# Patient Record
Sex: MALE | Race: WHITE | NOT HISPANIC OR LATINO | Employment: PART TIME | URBAN - METROPOLITAN AREA
[De-identification: names, ages, dates, MRNs, and addresses within clinical notes are randomized per-mention and may not be internally consistent; named-entity substitution may affect disease eponyms.]

---

## 2018-10-24 ENCOUNTER — OFFICE VISIT (OUTPATIENT)
Dept: URGENT CARE | Facility: CLINIC | Age: 18
End: 2018-10-24
Payer: COMMERCIAL

## 2018-10-24 VITALS
TEMPERATURE: 97 F | RESPIRATION RATE: 18 BRPM | WEIGHT: 250 LBS | SYSTOLIC BLOOD PRESSURE: 125 MMHG | BODY MASS INDEX: 33.13 KG/M2 | DIASTOLIC BLOOD PRESSURE: 75 MMHG | HEIGHT: 73 IN | HEART RATE: 66 BPM | OXYGEN SATURATION: 98 %

## 2018-10-24 DIAGNOSIS — J02.9 PHARYNGITIS, UNSPECIFIED ETIOLOGY: Primary | ICD-10-CM

## 2018-10-24 LAB — S PYO AG THROAT QL: NEGATIVE

## 2018-10-24 PROCEDURE — 87430 STREP A AG IA: CPT | Performed by: PHYSICIAN ASSISTANT

## 2018-10-24 PROCEDURE — 99203 OFFICE O/P NEW LOW 30 MIN: CPT | Performed by: PHYSICIAN ASSISTANT

## 2018-10-24 PROCEDURE — S9088 SERVICES PROVIDED IN URGENT: HCPCS | Performed by: PHYSICIAN ASSISTANT

## 2018-10-24 RX ORDER — PREDNISONE 50 MG/1
50 TABLET ORAL DAILY
Qty: 5 TABLET | Refills: 0 | Status: SHIPPED | OUTPATIENT
Start: 2018-10-24 | End: 2018-10-29

## 2018-10-24 RX ORDER — IBUPROFEN 600 MG/1
TABLET ORAL EVERY 6 HOURS PRN
COMMUNITY

## 2018-10-24 NOTE — LETTER
October 24, 2018     Patient: Franco Esteves   YOB: 2000   Date of Visit: 10/24/2018       To Whom It May Concern: It is my medical opinion that Franco Esteves may return to work on 10/26/2018  If you have any questions or concerns, please don't hesitate to call           Sincerely,        Harris Trevino PA-C    CC: No Recipients

## 2018-10-24 NOTE — PROGRESS NOTES
[facility]        NAME: Gilma Franco is a 19 y o  male  : 2000    MRN: [id number]  DATE: 2018  TIME: 4:44 PM    Assessment and Plan   Pharyngitis, unspecified etiology [J02 9]  1  Pharyngitis, unspecified etiology  POCT rapid strepA    predniSONE 50 mg tablet    Throat culture     Patient Instructions     Take medicine as prescribed  Will call if there is a discrepancy with your throat culture  Follow up with PCP in 3-5 days  Proceed to  ER if symptoms worsen  Chief Complaint     Chief Complaint   Patient presents with    Sore Throat     sore throat for 2 days     History of Present Illness       Sore Throat    This is a new problem  The current episode started yesterday  The problem has been gradually worsening  Neither side of throat is experiencing more pain than the other  There has been no fever  The pain is moderate  Associated symptoms include swollen glands and trouble swallowing  Pertinent negatives include no abdominal pain, congestion, coughing, diarrhea, drooling, ear discharge, ear pain, headaches, hoarse voice, plugged ear sensation, neck pain, shortness of breath, stridor or vomiting  Associated symptoms comments: Tactile fever  He has tried nothing for the symptoms  Review of Systems   Review of Systems   Constitutional: Positive for fatigue and fever  HENT: Positive for sore throat and trouble swallowing  Negative for congestion, drooling, ear discharge, ear pain and hoarse voice  Respiratory: Negative for apnea, cough, choking, chest tightness, shortness of breath, wheezing and stridor  Gastrointestinal: Negative for abdominal pain, diarrhea and vomiting  Musculoskeletal: Negative for neck pain  Neurological: Negative for headaches           Current Medications       Current Outpatient Prescriptions:     ibuprofen (MOTRIN) 600 mg tablet, Take by mouth every 6 (six) hours as needed for mild pain, Disp: , Rfl:     sertraline (ZOLOFT) 50 mg tablet, Take 50 mg by mouth daily, Disp: , Rfl:     predniSONE 50 mg tablet, Take 1 tablet (50 mg total) by mouth daily for 5 days, Disp: 5 tablet, Rfl: 0    Current Allergies     Allergies as of 10/24/2018    (No Known Allergies)            The following portions of the patient's history were reviewed and updated as appropriate: allergies, current medications, past family history, past medical history, past social history, past surgical history and problem list      Past Medical History:   Diagnosis Date    Anxiety     Depression        Past Surgical History:   Procedure Laterality Date    ADENOIDECTOMY      TONSILLECTOMY         Family History   Problem Relation Age of Onset    No Known Problems Mother     No Known Problems Father          Medications have been verified  Objective   /75   Pulse 66   Temp (!) 97 °F (36 1 °C) (Tympanic)   Resp 18   Ht 6' 1" (1 854 m)   Wt 113 kg (250 lb)   SpO2 98%   BMI 32 98 kg/m²        Physical Exam     Physical Exam   Constitutional: He appears well-developed and well-nourished  HENT:   Head: Normocephalic  Right Ear: External ear normal    Left Ear: External ear normal    Nose: Mucosal edema and rhinorrhea present  Mouth/Throat: Posterior oropharyngeal edema and posterior oropharyngeal erythema present  No oropharyngeal exudate  Cardiovascular: Normal rate, normal heart sounds and intact distal pulses  Exam reveals no gallop and no friction rub  No murmur heard  Pulmonary/Chest: Effort normal and breath sounds normal  No respiratory distress  He has no wheezes  He has no rales  Abdominal: Soft  Bowel sounds are normal  He exhibits no distension  There is no tenderness  There is no rebound and no guarding  Lymphadenopathy:     He has cervical adenopathy  Right cervical: Superficial cervical adenopathy present  Left cervical: Superficial cervical adenopathy present

## 2022-09-30 ENCOUNTER — HOSPITAL ENCOUNTER (EMERGENCY)
Facility: HOSPITAL | Age: 22
Discharge: HOME | End: 2022-09-30
Attending: EMERGENCY MEDICINE
Payer: COMMERCIAL

## 2022-09-30 VITALS
TEMPERATURE: 98.7 F | OXYGEN SATURATION: 99 % | WEIGHT: 180 LBS | SYSTOLIC BLOOD PRESSURE: 137 MMHG | HEART RATE: 87 BPM | DIASTOLIC BLOOD PRESSURE: 63 MMHG | RESPIRATION RATE: 18 BRPM

## 2022-09-30 DIAGNOSIS — F19.90 ILLICIT DRUG USE: Primary | ICD-10-CM

## 2022-09-30 DIAGNOSIS — T50.901A DRUG OVERDOSE: ICD-10-CM

## 2022-09-30 DIAGNOSIS — F41.9 ANXIETY: ICD-10-CM

## 2022-09-30 LAB
2HR DELTA HS TROPONIN: 6 NG/L
ALBUMIN SERPL BCP-MCNC: 4.6 G/DL (ref 3.5–5)
ALP SERPL-CCNC: 74 U/L (ref 46–116)
ALT SERPL W P-5'-P-CCNC: 39 U/L (ref 12–78)
AMPHETAMINES SERPL QL SCN: NEGATIVE
ANION GAP SERPL CALCULATED.3IONS-SCNC: 9 MMOL/L (ref 4–13)
APAP SERPL-MCNC: <2 UG/ML (ref 10–20)
AST SERPL W P-5'-P-CCNC: 36 U/L (ref 5–45)
BARBITURATES UR QL: NEGATIVE
BASOPHILS # BLD AUTO: 0.07 THOUSANDS/ΜL (ref 0–0.1)
BASOPHILS NFR BLD AUTO: 1 % (ref 0–1)
BENZODIAZ UR QL: NEGATIVE
BILIRUB SERPL-MCNC: 0.62 MG/DL (ref 0.2–1)
BILIRUB UR QL STRIP: NEGATIVE
BUN SERPL-MCNC: 20 MG/DL (ref 5–25)
CALCIUM SERPL-MCNC: 9.5 MG/DL (ref 8.3–10.1)
CARDIAC TROPONIN I PNL SERPL HS: 10 NG/L
CARDIAC TROPONIN I PNL SERPL HS: 4 NG/L
CHLORIDE SERPL-SCNC: 99 MMOL/L (ref 96–108)
CLARITY UR: CLEAR
CO2 SERPL-SCNC: 29 MMOL/L (ref 21–32)
COCAINE UR QL: NEGATIVE
COLOR UR: YELLOW
CREAT SERPL-MCNC: 1.18 MG/DL (ref 0.6–1.3)
EOSINOPHIL # BLD AUTO: 0.06 THOUSAND/ΜL (ref 0–0.61)
EOSINOPHIL NFR BLD AUTO: 1 % (ref 0–6)
ERYTHROCYTE [DISTWIDTH] IN BLOOD BY AUTOMATED COUNT: 11.9 % (ref 11.6–15.1)
ETHANOL SERPL-MCNC: <3 MG/DL (ref 0–3)
GFR SERPL CREATININE-BSD FRML MDRD: 85 ML/MIN/1.73SQ M
GLUCOSE SERPL-MCNC: 171 MG/DL (ref 65–140)
GLUCOSE UR STRIP-MCNC: NEGATIVE MG/DL
HCT VFR BLD AUTO: 46.5 % (ref 36.5–49.3)
HGB BLD-MCNC: 16.1 G/DL (ref 12–17)
HGB UR QL STRIP.AUTO: NEGATIVE
IMM GRANULOCYTES # BLD AUTO: 0.08 THOUSAND/UL (ref 0–0.2)
IMM GRANULOCYTES NFR BLD AUTO: 1 % (ref 0–2)
KETONES UR STRIP-MCNC: NEGATIVE MG/DL
LEUKOCYTE ESTERASE UR QL STRIP: NEGATIVE
LYMPHOCYTES # BLD AUTO: 1.09 THOUSANDS/ΜL (ref 0.6–4.47)
LYMPHOCYTES NFR BLD AUTO: 8 % (ref 14–44)
MCH RBC QN AUTO: 30.3 PG (ref 26.8–34.3)
MCHC RBC AUTO-ENTMCNC: 34.6 G/DL (ref 31.4–37.4)
MCV RBC AUTO: 88 FL (ref 82–98)
METHADONE UR QL: NEGATIVE
MONOCYTES # BLD AUTO: 0.66 THOUSAND/ΜL (ref 0.17–1.22)
MONOCYTES NFR BLD AUTO: 5 % (ref 4–12)
NEUTROPHILS # BLD AUTO: 11.11 THOUSANDS/ΜL (ref 1.85–7.62)
NEUTS SEG NFR BLD AUTO: 84 % (ref 43–75)
NITRITE UR QL STRIP: NEGATIVE
NRBC BLD AUTO-RTO: 0 /100 WBCS
OPIATES UR QL SCN: NEGATIVE
OXYCODONE+OXYMORPHONE UR QL SCN: NEGATIVE
PCP UR QL: NEGATIVE
PH UR STRIP.AUTO: 6 [PH]
PLATELET # BLD AUTO: 247 THOUSANDS/UL (ref 149–390)
PMV BLD AUTO: 10.3 FL (ref 8.9–12.7)
POTASSIUM SERPL-SCNC: 4.3 MMOL/L (ref 3.5–5.3)
PROT SERPL-MCNC: 8.3 G/DL (ref 6.4–8.4)
PROT UR STRIP-MCNC: NEGATIVE MG/DL
RBC # BLD AUTO: 5.31 MILLION/UL (ref 3.88–5.62)
SALICYLATES SERPL-MCNC: <3 MG/DL (ref 3–20)
SODIUM SERPL-SCNC: 137 MMOL/L (ref 135–147)
SP GR UR STRIP.AUTO: <=1.005 (ref 1–1.03)
THC UR QL: NEGATIVE
UROBILINOGEN UR QL STRIP.AUTO: 0.2 E.U./DL
WBC # BLD AUTO: 13.07 THOUSAND/UL (ref 4.31–10.16)

## 2022-09-30 PROCEDURE — 80307 DRUG TEST PRSMV CHEM ANLYZR: CPT | Performed by: PHYSICIAN ASSISTANT

## 2022-09-30 PROCEDURE — 82077 ASSAY SPEC XCP UR&BREATH IA: CPT | Performed by: PHYSICIAN ASSISTANT

## 2022-09-30 PROCEDURE — 84484 ASSAY OF TROPONIN QUANT: CPT | Performed by: PHYSICIAN ASSISTANT

## 2022-09-30 PROCEDURE — 80179 DRUG ASSAY SALICYLATE: CPT | Performed by: PHYSICIAN ASSISTANT

## 2022-09-30 PROCEDURE — 96361 HYDRATE IV INFUSION ADD-ON: CPT

## 2022-09-30 PROCEDURE — 99285 EMERGENCY DEPT VISIT HI MDM: CPT | Performed by: PHYSICIAN ASSISTANT

## 2022-09-30 PROCEDURE — 36415 COLL VENOUS BLD VENIPUNCTURE: CPT | Performed by: PHYSICIAN ASSISTANT

## 2022-09-30 PROCEDURE — 96360 HYDRATION IV INFUSION INIT: CPT

## 2022-09-30 PROCEDURE — 81003 URINALYSIS AUTO W/O SCOPE: CPT | Performed by: PHYSICIAN ASSISTANT

## 2022-09-30 PROCEDURE — 85025 COMPLETE CBC W/AUTO DIFF WBC: CPT | Performed by: PHYSICIAN ASSISTANT

## 2022-09-30 PROCEDURE — 99284 EMERGENCY DEPT VISIT MOD MDM: CPT

## 2022-09-30 PROCEDURE — 93005 ELECTROCARDIOGRAM TRACING: CPT

## 2022-09-30 PROCEDURE — 80053 COMPREHEN METABOLIC PANEL: CPT | Performed by: PHYSICIAN ASSISTANT

## 2022-09-30 PROCEDURE — 80143 DRUG ASSAY ACETAMINOPHEN: CPT | Performed by: PHYSICIAN ASSISTANT

## 2022-09-30 RX ORDER — ONDANSETRON 2 MG/ML
4 INJECTION INTRAMUSCULAR; INTRAVENOUS ONCE
Status: DISCONTINUED | OUTPATIENT
Start: 2022-09-30 | End: 2022-09-30 | Stop reason: HOSPADM

## 2022-09-30 RX ADMIN — SODIUM CHLORIDE 1000 ML: 0.9 INJECTION, SOLUTION INTRAVENOUS at 14:28

## 2022-09-30 RX ADMIN — SODIUM CHLORIDE 1000 ML: 0.9 INJECTION, SOLUTION INTRAVENOUS at 16:55

## 2022-09-30 NOTE — DISCHARGE INSTRUCTIONS
Please avoid any illicit drug use  Please return immediately with any thoughts of hurting yourself or anyone else  Please is return immediately with any thoughts of illegal drug use  Return with any new or worsening symptoms  I have placed a psychiatry referral for you in efforts to aid your outpatient therapy search

## 2022-09-30 NOTE — CONSULTS
INTERPROFESSIONAL (PHONE) [facility]  Blossom Silver 23 y o  male MRN: [id number]  Unit/Bed#: ED 06 Encounter: [id number]       Reason for Consult / Principal Problem: Drug ingestion    Inpatient consult to Toxicology  Consult performed by: Halima Wesley MD  Consult ordered by: Db Chan PA-C        09/30/22    ASSESSMENT:  Reported LSD ingestion  Sympathomimetic toxicity  Marijuana use  Tachycardia    RECOMMENDATIONS:  Obtain EKG, CBC, CMP, ETOH/salicylate/acetaminophen levels  Maintain cardiac telemetry while tachycardic and monitor airway  Treatment is supportive with IVF hydration and benzodiazepines as needed for agitation  Patient should be monitored for 6 hours post ingestion to ensure no worsening of toxicity  If patient's vital signs and mentation return to normal and he is asymptomatic and ambulatory with a steady gait in addition to having normal labs, he is appropriate for disposition from a toxicology perspective  For further questions, please contact the medical  on call via [place] [facility] or throughl the [employer]  Service or Patient [facility]  Please see additional teaching note below:    [facility]   [address]  Sympathomimetic Toxicity     Sympathomimetic toxicity is diagnosed clinically by tachycardia, hypertension, mydriasis, diaphoresis, agitation, and hyperthermia secondary to exposure to sympathomimetic agent, such as cocaine or amphetamine/methamphetamines  This causes overstimulation of central nervous system resulting in increased catecholamine release  Primary treatment includes benzodiazepines to suppress central nervous system stimulation  If not treated aggressively enough, hyperthermia and agitation may result in disseminated intravascular coagulation and renal injury, respectively   If hyperthermia occurs and is refractory to aggressive treatment with benzodiazepines, intravenous fluids and external cooling, then intubation and paralysis are necessary  If renal injury is present, patient should be assessed for rhabdomyolysis  In addition, cardiac concerns include ischemia and sodium channel blockade  Untreated tachycardia can result in demand ischemia, especially if there is any underlying coronary artery disease  Na channel blockade is demonstrated by QRS widening on ECG and is treated with sodium bicarbonate  Treatment of these issues should be in conjunction with a medical       Hx and PE limited by the dynamics of a phone consultation  I have not personally interviewed or evaluated the patient, but only advised based on the information provided to me  Primary provider is responsible for all clinical decisions  Pertinent history, physical exam and clinical findings and course discussed: Bertha Barfield is a 23y o  year old male who presents with palpitations after reported ingestion of 4 LSD tablets  Patient also reports taking a benzodiazepine and smoking marijuana prior to arrival  Ingestion was 1 hour prior presentation  Patient tachycardic and mildly hypertensive  Labs pending  EKG with tachycardia and normal intervals  Review of systems and physical exam not performed by me  Historical Information   Past Medical History:   Diagnosis Date    Anxiety      History reviewed  No pertinent surgical history  Social History   Social History     Substance and Sexual Activity   Alcohol Use Yes     Social History     Substance and Sexual Activity   Drug Use Yes    Types: LSD, Other, Marijuana, Benzodiazepines    Comment: THENZODIAZAPINE     Social History     Tobacco Use   Smoking Status Never Smoker   Smokeless Tobacco Never Used     History reviewed  No pertinent family history       Prior to Admission medications    Not on File       Current Facility-Administered Medications   Medication Dose Route Frequency    ondansetron (ZOFRAN) injection 4 mg  4 mg Intravenous Once    sodium chloride 0 9 % bolus 1,000 mL  1,000 mL Intravenous Once       Allergies   Allergen Reactions    Amphetamine-Dextroamphetamine Other (See Comments)    Cat Hair Extract Other (See Comments)    Hydroxyzine Other (See Comments)    Pollen Extract Other (See Comments)       Objective     No intake or output data in the 24 hours ending 09/30/22 1458    Invasive Devices:   Peripheral IV 09/30/22 Right Antecubital (Active)   Site Assessment WDL 09/30/22 1427   Dressing Type Transparent 09/30/22 1427   Line Status Blood return noted; Flushed; Infusing 09/30/22 1427   Dressing Status Clean;Dry; Intact 09/30/22 1427       Vitals   Vitals:    09/30/22 1407 09/30/22 1415 09/30/22 1430 09/30/22 1445   BP: 142/88 137/81 139/72 118/57   Pulse: (!) 133 (!) 129 (!) 123 (!) 120   Resp: 18 19 22 16   Temp: 98 7 °F (37 1 °C)            EKG, Pathology, and/or Other Studies: I have personally reviewed pertinent reports  Lab Results: I have personally reviewed pertinent reports  Labs:    Results from last 7 days   Lab Units 09/30/22  1428   WBC Thousand/uL 13 07*   HEMOGLOBIN g/dL 16 1   HEMATOCRIT % 46 5   PLATELETS Thousands/uL 247   NEUTROS PCT % 84*   LYMPHS PCT % 8*   MONOS PCT % 5          Invalid input(s): LABALBU           No results found for: TROPONINI          Invalid input(s): EXTPREGUR      Imaging Studies: n/a    Counseling / Coordination of Care  Total time spent today 13 minutes  This was a phone consultation

## 2022-09-30 NOTE — ED PROVIDER NOTES
History  Chief Complaint   Patient presents with    Medical Problem     Pt arrives reporting he took 4 tabs of LSD because it makes his mood feel better  Pt reports he has taken it in the past for his anxiety  Pt reports heart racing at time and denies any SI/HI  23year old male presents to the ED with mother for evaluation  Patient states he took 4 LSD pills to make his mood better  States he has take LSD to improve his mood before but never this much  Patient states he then took a benzo and smoked some weed to counter act the LSD  Was brought in by mom because he felt like his heart was racing and was nauseous at home  He has no complaints now  Patient denies any suicidal or homicidal ideations  Patient denies any hallucinations  Reports history of anxiety and depression and states he has been trying to get therapy set up as an outpatient however states due to insurance reason he has been unable to so far  Patient states he took LSD "to make the world one again" for "unity" "I just want harmony for everyone, we can be one "       History provided by:  Patient and parent  Overdose - Accidental  Ingested substance:  Illicit drugs  Illicit drug type:  Marijuana and other  Time since incident:  1 hour  Ingestion amount:  4 pills of LSD, 1 benzo, and smoked "a little" marijuana  Witnesses present: no    Called poison control: spoke with Toxicology  Incident location:  Home  Associated symptoms: altered mental status and nausea    Associated symptoms: no abdominal pain, no agitation, no anxiety, no chest pain, no cough, no diaphoresis, no diarrhea, no headaches, no lethargy, no shortness of breath, no slurred speech, no unresponsiveness, no visual changes and no vomiting        None       Past Medical History:   Diagnosis Date    Anxiety        History reviewed  No pertinent surgical history  History reviewed  No pertinent family history    I have reviewed and agree with the history as documented  E-Cigarette/Vaping     E-Cigarette/Vaping Substances     Social History     Tobacco Use    Smoking status: Never Smoker    Smokeless tobacco: Never Used   Substance Use Topics    Alcohol use: Yes    Drug use: Yes     Types: LSD, Other, Marijuana, Benzodiazepines     Comment: THENZODIAZAPINE       Review of Systems   Constitutional: Negative for appetite change, chills, diaphoresis, fatigue and fever  HENT: Negative  Respiratory: Negative for cough, choking, chest tightness and shortness of breath  Cardiovascular: Positive for palpitations  Negative for chest pain and leg swelling  Gastrointestinal: Positive for nausea  Negative for abdominal pain, diarrhea and vomiting  Musculoskeletal: Negative  Skin: Negative  Neurological: Negative  Negative for headaches  Psychiatric/Behavioral: Negative for agitation  All other systems reviewed and are negative  Physical Exam  Physical Exam  Vitals and nursing note reviewed  Constitutional:       General: He is not in acute distress  Appearance: Normal appearance  He is not ill-appearing, toxic-appearing or diaphoretic  HENT:      Head: Normocephalic and atraumatic  Nose: Nose normal       Mouth/Throat:      Pharynx: Oropharynx is clear  Eyes:      Extraocular Movements: Extraocular movements intact  Conjunctiva/sclera: Conjunctivae normal       Pupils: Pupils are equal, round, and reactive to light  Cardiovascular:      Rate and Rhythm: Regular rhythm  Tachycardia present  Pulmonary:      Effort: Pulmonary effort is normal  No respiratory distress  Breath sounds: Normal breath sounds  No stridor  No wheezing, rhonchi or rales  Chest:      Chest wall: No tenderness  Musculoskeletal:         General: No swelling or tenderness  Normal range of motion  Cervical back: Normal range of motion  Skin:     General: Skin is warm and dry        Capillary Refill: Capillary refill takes less than 2 seconds  Findings: No rash  Neurological:      General: No focal deficit present  Mental Status: He is alert  GCS: GCS eye subscore is 4  GCS verbal subscore is 4  GCS motor subscore is 6  Cranial Nerves: Cranial nerves are intact  Sensory: Sensation is intact  Motor: Motor function is intact  Psychiatric:         Mood and Affect: Mood normal          Behavior: Behavior normal          Thought Content: Thought content is not paranoid or delusional  Thought content does not include homicidal or suicidal ideation  Thought content does not include homicidal or suicidal plan           Vital Signs  ED Triage Vitals   Temperature Pulse Respirations Blood Pressure SpO2   09/30/22 1407 09/30/22 1407 09/30/22 1407 09/30/22 1407 09/30/22 1407   98 7 °F (37 1 °C) (!) 133 18 142/88 98 %      Temp src Heart Rate Source Patient Position - Orthostatic VS BP Location FiO2 (%)   -- 09/30/22 1415 09/30/22 1916 09/30/22 1916 --    Monitor Lying Right arm       Pain Score       09/30/22 1407       No Pain           Vitals:    09/30/22 1730 09/30/22 1745 09/30/22 1915 09/30/22 1916   BP: 126/56 127/92 137/63 137/63   Pulse: 101 97 85 87   Patient Position - Orthostatic VS:    Lying         Visual Acuity      ED Medications  Medications   ondansetron (ZOFRAN) injection 4 mg (4 mg Intravenous Not Given 9/30/22 1656)   sodium chloride 0 9 % bolus 1,000 mL (0 mL Intravenous Stopped 9/30/22 1528)   sodium chloride 0 9 % bolus 1,000 mL (0 mL Intravenous Stopped 9/30/22 1755)       Diagnostic Studies  Results Reviewed     Procedure Component Value Units Date/Time    HS Troponin I 2hr [[id number]]  (Normal) Collected: 09/30/22 1655    Lab Status: Final result Specimen: Blood from Arm, Right Updated: 09/30/22 1729     hs TnI 2hr 10 ng/L      Delta 2hr hsTnI 6 ng/L     Rapid drug screen, urine [[id number]]  (Normal) Collected: 09/30/22 1620    Lab Status: Final result Specimen: Urine, Clean Catch Updated: 09/30/22 1717     Amph/Meth UR Negative     Barbiturate Ur Negative     Benzodiazepine Urine Negative     Cocaine Urine Negative     Methadone Urine Negative     Opiate Urine Negative     PCP Ur Negative     THC Urine Negative     Oxycodone Urine Negative    Narrative:      FOR MEDICAL PURPOSES ONLY  IF CONFIRMATION NEEDED PLEASE CONTACT THE LAB WITHIN 5 DAYS      Drug Screen Cutoff Levels:  AMPHETAMINE/METHAMPHETAMINES  1000 ng/mL  BARBITURATES     200 ng/mL  BENZODIAZEPINES     200 ng/mL  COCAINE      300 ng/mL  METHADONE      300 ng/mL  OPIATES      300 ng/mL  PHENCYCLIDINE     25 ng/mL  THC       50 ng/mL  OXYCODONE      100 ng/mL    UA (URINE) with reflex to Scope [[id number]] Collected: 09/30/22 1620    Lab Status: Final result Specimen: Urine, Clean Catch Updated: 09/30/22 1653     Color, UA Yellow     Clarity, UA Clear     Specific Gravity, UA <=1 005     pH, UA 6 0     Leukocytes, UA Negative     Nitrite, UA Negative     Protein, UA Negative mg/dl      Glucose, UA Negative mg/dl      Ketones, UA Negative mg/dl      Urobilinogen, UA 0 2 E U /dl      Bilirubin, UA Negative     Occult Blood, UA Negative    HS Troponin I 4hr [[id number]]     Lab Status: No result Specimen: Blood     Acetaminophen level-"If concentration is detectable, please discuss with medical  on call " [[id number]]  (Abnormal) Collected: 09/30/22 1428    Lab Status: Final result Specimen: Blood from Arm, Right Updated: 09/30/22 1528     Acetaminophen Level <2 0 ug/mL     Comprehensive metabolic panel [[id number]]  (Abnormal) Collected: 09/30/22 1428    Lab Status: Final result Specimen: Blood from Arm, Right Updated: 09/30/22 1501     Sodium 137 mmol/L      Potassium 4 3 mmol/L      Chloride 99 mmol/L      CO2 29 mmol/L      ANION GAP 9 mmol/L      BUN 20 mg/dL      Creatinine 1 18 mg/dL      Glucose 171 mg/dL      Calcium 9 5 mg/dL      AST 36 U/L      ALT 39 U/L      Alkaline Phosphatase 74 U/L      Total Protein 8 3 g/dL      Albumin 4 6 g/dL      Total Bilirubin 0 62 mg/dL      eGFR 85 ml/min/1 73sq m     Narrative:      National Kidney Disease Foundation guidelines for Chronic Kidney Disease (CKD):     Stage 1 with normal or high GFR (GFR > 90 mL/min/1 73 square meters)    Stage 2 Mild CKD (GFR = 60-89 mL/min/1 73 square meters)    Stage 3A Moderate CKD (GFR = 45-59 mL/min/1 73 square meters)    Stage 3B Moderate CKD (GFR = 30-44 mL/min/1 73 square meters)    Stage 4 Severe CKD (GFR = 15-29 mL/min/1 73 square meters)    Stage 5 End Stage CKD (GFR <15 mL/min/1 73 square meters)  Note: GFR calculation is accurate only with a steady state creatinine    Salicylate level [[id number]]  (Abnormal) Collected: 09/30/22 1428    Lab Status: Final result Specimen: Blood from Arm, Right Updated: 81/62/74 7194     Salicylate Lvl <3 mg/dL     Ethanol [[id number]]  (Normal) Collected: 09/30/22 1428    Lab Status: Final result Specimen: Blood from Arm, Right Updated: 09/30/22 1501     Ethanol Lvl <3 mg/dL     HS Troponin 0hr (reflex protocol) [[id number]]  (Normal) Collected: 09/30/22 1428    Lab Status: Final result Specimen: Blood from Arm, Right Updated: 09/30/22 1501     hs TnI 0hr 4 ng/L     CBC and differential [[id number]]  (Abnormal) Collected: 09/30/22 1428    Lab Status: Final result Specimen: Blood from Arm, Right Updated: 09/30/22 1437     WBC 13 07 Thousand/uL      RBC 5 31 Million/uL      Hemoglobin 16 1 g/dL      Hematocrit 46 5 %      MCV 88 fL      MCH 30 3 pg      MCHC 34 6 g/dL      RDW 11 9 %      MPV 10 3 fL      Platelets 005 Thousands/uL      nRBC 0 /100 WBCs      Neutrophils Relative 84 %      Immat GRANS % 1 %      Lymphocytes Relative 8 %      Monocytes Relative 5 %      Eosinophils Relative 1 %      Basophils Relative 1 %      Neutrophils Absolute 11 11 Thousands/µL      Immature Grans Absolute 0 08 Thousand/uL      Lymphocytes Absolute 1 09 Thousands/µL      Monocytes Absolute 0 66 Thousand/µL      Eosinophils Absolute 0 06 Thousand/µL      Basophils Absolute 0 07 Thousands/µL                  No orders to display              Procedures  ECG 12 Lead Documentation Only    Date/Time: 9/30/2022 4:39 PM  Performed by: Morro Miranda PA-C  Authorized by: Morro Miranda PA-C     Patient location:  ED  Interpretation:     Interpretation: normal    Rate:     ECG rate:  133    ECG rate assessment: tachycardic    Rhythm:     Rhythm: sinus tachycardia    Ectopy:     Ectopy: none    QRS:     QRS axis:  Normal    QRS intervals:  Normal  Conduction:     Conduction: normal    ST segments:     ST segments:  Normal  T waves:     T waves: normal               ED Course  ED Course as of 09/30/22 1951   Fri Sep 30, 2022   1421 TT sent to toxicology    1443 WBC(!): 13 07   1504 hs TnI 0hr: 4   1505 MEDICAL ALCOHOL: <3   1205 SALICYLATE LEVEL(!): <3   4625 Patient continues to rest comfortably  No complaints at this time  Continues to be tachycardic however heart rate improving currently 110 bpm   1725 BARBITURATE URINE: Negative   1726 THC URINE: Negative   1922 Pulse: 87   1922 Blood Pressure: 137/63   1922 Patient has been for observed for 5 5 hours  Patient requesting for discharge home  Heart rate normal at this time  Blood pressure normal   We discussed all results and findings  Patient states he feels good to return home  Will call for a ride  MDM  Number of Diagnoses or Management Options  Drug overdose: new and requires workup  Diagnosis management comments: 23year old male presents to the emergency department for evaluation status post ingestion of illicit substances  Vitals in medical record reviewed  Discussed with Toxicology who recommended laboratory work, observation, fluids  Laboratory findings were unremarkable  Heart rate improved with fluids  Blood pressure remains normal   Patient was observed for 5-1/2 hours which was 6-1/2 hours after ingestion  Patient fells safe to return home    Discussed results and findings, symptomatic treatment at home and strict return precautions which she verbalized understanding  He was clinically and hemodynamically stable for discharge       Amount and/or Complexity of Data Reviewed  Clinical lab tests: ordered and reviewed  Discuss the patient with other providers: yes  Independent visualization of images, tracings, or specimens: yes        Disposition  Final diagnoses:   Drug overdose   Illicit drug use   Anxiety     Time reflects when diagnosis was documented in both MDM as applicable and the Disposition within this note     Time User Action Codes Description Comment    9/30/2022  2:28 PM Florinda Ysabel Drug overdose     9/30/2022  7:49 PM Sanjeev Gael Add [Y03 26] Illicit drug use     4/30/0760  7:49 PM Sanejev Gael Modify [T50 901A] Drug overdose     9/30/2022  7:49 PM Richard Michellee [Y91 86] Illicit drug use     4/79/1567  7:50 PM Sanjeev Gael Add [F41 9] Anxiety       ED Disposition     ED Disposition   Discharge    Condition   Stable    Date/Time   Fri Sep 30, 2022  7:24 PM    Comment   Jaziel Weber discharge to home/self care                 Follow-up Information     Follow up With Specialties Details Why [address] [facility] [address], Nurse Practitioner   [address] [facility] [address]  [phone]            Patient's Medications    No medications on file           PDMP Review     None          ED Provider  Electronically Signed by           Mckenzie Bar PA-C  09/30/22 1949       Mckenzie Bar PA-C  09/30/22 1951

## 2022-10-09 LAB
ATRIAL RATE: 133 BPM
P AXIS: 46 DEGREES
PR INTERVAL: 146 MS
QRS AXIS: 18 DEGREES
QRSD INTERVAL: 90 MS
QT INTERVAL: 296 MS
QTC INTERVAL: 440 MS
T WAVE AXIS: 45 DEGREES
VENTRICULAR RATE: 133 BPM

## 2022-10-26 ENCOUNTER — TELEPHONE (OUTPATIENT)
Dept: PSYCHIATRY | Facility: CLINIC | Age: 22
End: 2022-10-26

## 2022-10-26 NOTE — TELEPHONE ENCOUNTER
Called patient regarding routine referral  LVM advising to return call to intake @ [phone] to be placed on wait list

## 2022-10-31 NOTE — TELEPHONE ENCOUNTER
Called patient regarding routine referral  LVM advising to return call to intake @ [phone] to be placed on wait list

## 2024-09-11 ENCOUNTER — HOSPITAL ENCOUNTER (EMERGENCY)
Facility: HOSPITAL | Age: 24
End: 2024-09-11
Attending: EMERGENCY MEDICINE | Admitting: EMERGENCY MEDICINE
Payer: COMMERCIAL

## 2024-09-11 VITALS
SYSTOLIC BLOOD PRESSURE: 161 MMHG | HEART RATE: 110 BPM | BODY MASS INDEX: 25.77 KG/M2 | RESPIRATION RATE: 18 BRPM | WEIGHT: 180 LBS | TEMPERATURE: 97.4 F | HEIGHT: 70 IN | OXYGEN SATURATION: 96 % | DIASTOLIC BLOOD PRESSURE: 98 MMHG

## 2024-09-11 DIAGNOSIS — F32.A DEPRESSION, UNSPECIFIED DEPRESSION TYPE: ICD-10-CM

## 2024-09-11 DIAGNOSIS — R45.851 SUICIDAL IDEATIONS: Primary | ICD-10-CM

## 2024-09-11 PROBLEM — Z00.8 ENCOUNTER FOR PSYCHOLOGICAL EVALUATION: Status: ACTIVE | Noted: 2024-09-11

## 2024-09-11 LAB
ALBUMIN SERPL BCG-MCNC: 4.7 G/DL (ref 3.5–5)
ALP SERPL-CCNC: 59 U/L (ref 34–104)
ALT SERPL W P-5'-P-CCNC: 36 U/L (ref 7–52)
AMPHETAMINES SERPL QL SCN: NEGATIVE
ANION GAP SERPL CALCULATED.3IONS-SCNC: 7 MMOL/L (ref 4–13)
AST SERPL W P-5'-P-CCNC: 35 U/L (ref 13–39)
BARBITURATES UR QL: NEGATIVE
BASOPHILS # BLD AUTO: 0.03 THOUSANDS/ÂΜL (ref 0–0.1)
BASOPHILS NFR BLD AUTO: 0 % (ref 0–1)
BENZODIAZ UR QL: NEGATIVE
BILIRUB SERPL-MCNC: 0.67 MG/DL (ref 0.2–1)
BUN SERPL-MCNC: 17 MG/DL (ref 5–25)
CALCIUM SERPL-MCNC: 10 MG/DL (ref 8.4–10.2)
CHLORIDE SERPL-SCNC: 102 MMOL/L (ref 96–108)
CO2 SERPL-SCNC: 29 MMOL/L (ref 21–32)
COCAINE UR QL: NEGATIVE
CREAT SERPL-MCNC: 0.92 MG/DL (ref 0.6–1.3)
EOSINOPHIL # BLD AUTO: 0.08 THOUSAND/ÂΜL (ref 0–0.61)
EOSINOPHIL NFR BLD AUTO: 1 % (ref 0–6)
ERYTHROCYTE [DISTWIDTH] IN BLOOD BY AUTOMATED COUNT: 11.9 % (ref 11.6–15.1)
ETHANOL EXG-MCNC: 0 MG/DL
ETHANOL SERPL-MCNC: <10 MG/DL
FENTANYL UR QL SCN: NEGATIVE
GFR SERPL CREATININE-BSD FRML MDRD: 114 ML/MIN/1.73SQ M
GLUCOSE SERPL-MCNC: 73 MG/DL (ref 65–140)
HCT VFR BLD AUTO: 48.6 % (ref 36.5–49.3)
HGB BLD-MCNC: 17.1 G/DL (ref 12–17)
HYDROCODONE UR QL SCN: NEGATIVE
IMM GRANULOCYTES # BLD AUTO: 0.04 THOUSAND/UL (ref 0–0.2)
IMM GRANULOCYTES NFR BLD AUTO: 1 % (ref 0–2)
LYMPHOCYTES # BLD AUTO: 2.46 THOUSANDS/ÂΜL (ref 0.6–4.47)
LYMPHOCYTES NFR BLD AUTO: 36 % (ref 14–44)
MCH RBC QN AUTO: 30 PG (ref 26.8–34.3)
MCHC RBC AUTO-ENTMCNC: 35.2 G/DL (ref 31.4–37.4)
MCV RBC AUTO: 85 FL (ref 82–98)
METHADONE UR QL: NEGATIVE
MONOCYTES # BLD AUTO: 0.36 THOUSAND/ÂΜL (ref 0.17–1.22)
MONOCYTES NFR BLD AUTO: 5 % (ref 4–12)
NEUTROPHILS # BLD AUTO: 3.95 THOUSANDS/ÂΜL (ref 1.85–7.62)
NEUTS SEG NFR BLD AUTO: 57 % (ref 43–75)
NRBC BLD AUTO-RTO: 0 /100 WBCS
OPIATES UR QL SCN: NEGATIVE
OXYCODONE+OXYMORPHONE UR QL SCN: NEGATIVE
PCP UR QL: NEGATIVE
PLATELET # BLD AUTO: 221 THOUSANDS/UL (ref 149–390)
PMV BLD AUTO: 10.2 FL (ref 8.9–12.7)
POTASSIUM SERPL-SCNC: 4.1 MMOL/L (ref 3.5–5.3)
PROT SERPL-MCNC: 8.1 G/DL (ref 6.4–8.4)
RBC # BLD AUTO: 5.7 MILLION/UL (ref 3.88–5.62)
SODIUM SERPL-SCNC: 138 MMOL/L (ref 135–147)
THC UR QL: POSITIVE
TSH SERPL DL<=0.05 MIU/L-ACNC: 1.29 UIU/ML (ref 0.45–4.5)
WBC # BLD AUTO: 6.92 THOUSAND/UL (ref 4.31–10.16)

## 2024-09-11 PROCEDURE — 82075 ASSAY OF BREATH ETHANOL: CPT

## 2024-09-11 PROCEDURE — 82077 ASSAY SPEC XCP UR&BREATH IA: CPT | Performed by: EMERGENCY MEDICINE

## 2024-09-11 PROCEDURE — 99285 EMERGENCY DEPT VISIT HI MDM: CPT

## 2024-09-11 PROCEDURE — 80053 COMPREHEN METABOLIC PANEL: CPT | Performed by: EMERGENCY MEDICINE

## 2024-09-11 PROCEDURE — 99285 EMERGENCY DEPT VISIT HI MDM: CPT | Performed by: EMERGENCY MEDICINE

## 2024-09-11 PROCEDURE — 80307 DRUG TEST PRSMV CHEM ANLYZR: CPT

## 2024-09-11 PROCEDURE — 84443 ASSAY THYROID STIM HORMONE: CPT | Performed by: EMERGENCY MEDICINE

## 2024-09-11 PROCEDURE — 36415 COLL VENOUS BLD VENIPUNCTURE: CPT | Performed by: EMERGENCY MEDICINE

## 2024-09-11 PROCEDURE — 85025 COMPLETE CBC W/AUTO DIFF WBC: CPT | Performed by: EMERGENCY MEDICINE

## 2024-09-11 RX ORDER — IBUPROFEN 400 MG/1
800 TABLET, FILM COATED ORAL EVERY 8 HOURS PRN
Status: CANCELLED | OUTPATIENT
Start: 2024-09-11

## 2024-09-11 RX ORDER — HALOPERIDOL 5 MG/1
5 TABLET ORAL
Status: CANCELLED | OUTPATIENT
Start: 2024-09-11

## 2024-09-11 RX ORDER — LORAZEPAM 1 MG/1
1 TABLET ORAL
Status: CANCELLED | OUTPATIENT
Start: 2024-09-11

## 2024-09-11 RX ORDER — HALOPERIDOL 1 MG/1
1 TABLET ORAL EVERY 6 HOURS PRN
Status: CANCELLED | OUTPATIENT
Start: 2024-09-11

## 2024-09-11 RX ORDER — LORAZEPAM 2 MG/ML
1 INJECTION INTRAMUSCULAR
Status: CANCELLED | OUTPATIENT
Start: 2024-09-11

## 2024-09-11 RX ORDER — BENZTROPINE MESYLATE 1 MG/ML
0.5 INJECTION, SOLUTION INTRAMUSCULAR; INTRAVENOUS
Status: CANCELLED | OUTPATIENT
Start: 2024-09-11

## 2024-09-11 RX ORDER — HALOPERIDOL 5 MG/1
2.5 TABLET ORAL
Status: CANCELLED | OUTPATIENT
Start: 2024-09-11

## 2024-09-11 RX ORDER — BENZTROPINE MESYLATE 1 MG/1
1 TABLET ORAL
Status: CANCELLED | OUTPATIENT
Start: 2024-09-11

## 2024-09-11 RX ORDER — PROPRANOLOL HYDROCHLORIDE 20 MG/1
10 TABLET ORAL EVERY 8 HOURS PRN
Status: CANCELLED | OUTPATIENT
Start: 2024-09-11

## 2024-09-11 RX ORDER — POLYETHYLENE GLYCOL 3350 17 G/17G
17 POWDER, FOR SOLUTION ORAL DAILY PRN
Status: CANCELLED | OUTPATIENT
Start: 2024-09-11

## 2024-09-11 RX ORDER — LORAZEPAM 1 MG/1
1 TABLET ORAL EVERY 6 HOURS PRN
Status: DISCONTINUED | OUTPATIENT
Start: 2024-09-11 | End: 2024-09-12 | Stop reason: HOSPADM

## 2024-09-11 RX ORDER — MAGNESIUM HYDROXIDE/ALUMINUM HYDROXICE/SIMETHICONE 120; 1200; 1200 MG/30ML; MG/30ML; MG/30ML
30 SUSPENSION ORAL EVERY 4 HOURS PRN
Status: CANCELLED | OUTPATIENT
Start: 2024-09-11

## 2024-09-11 RX ORDER — BISACODYL 10 MG
10 SUPPOSITORY, RECTAL RECTAL DAILY PRN
Status: CANCELLED | OUTPATIENT
Start: 2024-09-11

## 2024-09-11 RX ORDER — IBUPROFEN 400 MG/1
400 TABLET, FILM COATED ORAL EVERY 4 HOURS PRN
Status: CANCELLED | OUTPATIENT
Start: 2024-09-11

## 2024-09-11 RX ORDER — HALOPERIDOL 5 MG/ML
2.5 INJECTION INTRAMUSCULAR
Status: CANCELLED | OUTPATIENT
Start: 2024-09-11

## 2024-09-11 RX ORDER — IBUPROFEN 600 MG/1
600 TABLET, FILM COATED ORAL EVERY 6 HOURS PRN
Status: CANCELLED | OUTPATIENT
Start: 2024-09-11

## 2024-09-11 RX ORDER — AMOXICILLIN 250 MG
1 CAPSULE ORAL DAILY PRN
Status: CANCELLED | OUTPATIENT
Start: 2024-09-11

## 2024-09-11 RX ORDER — BENZTROPINE MESYLATE 1 MG/ML
1 INJECTION, SOLUTION INTRAMUSCULAR; INTRAVENOUS
Status: CANCELLED | OUTPATIENT
Start: 2024-09-11

## 2024-09-11 RX ORDER — LORAZEPAM 2 MG/ML
2 INJECTION INTRAMUSCULAR
Status: CANCELLED | OUTPATIENT
Start: 2024-09-11

## 2024-09-11 RX ORDER — TRAZODONE HYDROCHLORIDE 50 MG/1
50 TABLET, FILM COATED ORAL
Status: CANCELLED | OUTPATIENT
Start: 2024-09-11

## 2024-09-11 RX ORDER — LANOLIN ALCOHOL/MO/W.PET/CERES
3 CREAM (GRAM) TOPICAL
Status: CANCELLED | OUTPATIENT
Start: 2024-09-11

## 2024-09-11 RX ORDER — HALOPERIDOL 5 MG/ML
5 INJECTION INTRAMUSCULAR
Status: CANCELLED | OUTPATIENT
Start: 2024-09-11

## 2024-09-11 RX ADMIN — LORAZEPAM 1 MG: 1 TABLET ORAL at 15:06

## 2024-09-11 NOTE — ED NOTES
Patient is accepted at [facility].  Patient is accepted by Dr. Norton per Ho NAIK     Transportation is arranged with [employer].     Transportation is scheduled for 11:40 pm.   Patient may go to the floor at 11:40 pm.          Nurse report is to be called to [phone] prior to patient transfer.

## 2024-09-11 NOTE — ED PROVIDER NOTES
1. Suicidal ideations    2. Depression, unspecified depression type      ED Disposition       ED Disposition   Transfer to Behavioral Health Condition   --    Date/Time   Wed Sep 11, 2024  3:09 PM    Comment                  Assessment & Plan       Medical Decision Making  1416: Patient appears anxious, vital signs reviewed.  Suicide precautions in place.  Plan to complete basic labs including tox screens.  Plan to consult ED crisis for evaluation.    1600: Labs reviewed.  The patient remained stable throughout ED course.  Medically cleared for psychiatric evaluation.    1800: Admit 201 consent signed.    Amount and/or Complexity of Data Reviewed  Labs: ordered.    Risk  Prescription drug management.  Decision regarding hospitalization.                       Medications   LORazepam (ATIVAN) tablet 1 mg (1 mg Oral Given 9/11/24 1506)       History of Present Illness         History provided by:  Medical records and patient  Psychiatric Evaluation  Presenting symptoms: depression and suicidal thoughts    Presenting symptoms: no agitation, no hallucinations and no self-mutilation    Patient accompanied by: Self.  Degree of incapacity (severity):  Moderate  Onset quality:  Gradual  Duration:  1 month  Timing:  Constant  Progression:  Worsening  Chronicity:  Recurrent  Context: noncompliance and stressful life event    Context comment:  Right leg drift history of depression, noncompliant with antidepressants as he states these did not help his mood, has not taken in over 2 years, increased strain with his relationships with family and girlfriend.  Patient having increased depression/SI  Treatment compliance:  Untreated  Time since last psychoactive medication taken:  2 years  Relieved by:  Nothing  Worsened by:  Nothing (States he uses THC and shrooms occasionally)  Ineffective treatments:  None tried  Associated symptoms: anxiety    Associated symptoms: no abdominal pain, no appetite change, no chest pain, no fatigue  and no headaches    Risk factors: family hx of mental illness and hx of mental illness    Risk factors: no recent psychiatric admission            Review of Systems   Constitutional:  Negative for appetite change, chills, fatigue and fever.   HENT:  Negative for ear pain, rhinorrhea, sore throat and trouble swallowing.    Eyes:  Negative for pain, discharge and visual disturbance.   Respiratory:  Negative for cough, chest tightness and shortness of breath.    Cardiovascular:  Negative for chest pain and palpitations.   Gastrointestinal:  Negative for abdominal pain, nausea and vomiting.   Endocrine: Negative for polydipsia, polyphagia and polyuria.   Genitourinary:  Negative for difficulty urinating, dysuria, hematuria and testicular pain.   Musculoskeletal:  Negative for arthralgias and back pain.   Skin:  Negative for color change and rash.   Allergic/Immunologic: Negative for immunocompromised state.   Neurological:  Negative for dizziness, seizures, syncope, weakness and headaches.   Hematological:  Negative for adenopathy.   Psychiatric/Behavioral:  Positive for dysphoric mood and suicidal ideas. Negative for agitation, behavioral problems, confusion, decreased concentration, hallucinations, self-injury and sleep disturbance. The patient is nervous/anxious. The patient is not hyperactive.    All other systems reviewed and are negative.          Objective     ED Triage Vitals [09/11/24 1416]   Temperature Pulse Blood Pressure Respirations SpO2 Patient Position - Orthostatic VS   (!) 97.4 °F (36.3 °C) (!) 110 161/98 18 96 % Sitting      Temp Source Heart Rate Source BP Location FiO2 (%) Pain Score    Temporal Monitor Left arm -- --        Physical Exam  Vitals and nursing note reviewed.   Constitutional:       General: He is not in acute distress.     Appearance: Normal appearance. He is not ill-appearing, toxic-appearing or diaphoretic.   HENT:      Head: Normocephalic and atraumatic.      Nose: Nose normal. No  congestion or rhinorrhea.      Mouth/Throat:      Mouth: Mucous membranes are moist.      Pharynx: Oropharynx is clear. No oropharyngeal exudate or posterior oropharyngeal erythema.   Eyes:      General:         Right eye: No discharge.         Left eye: No discharge.   Cardiovascular:      Rate and Rhythm: Normal rate and regular rhythm.      Pulses: Normal pulses.      Heart sounds: Normal heart sounds. No murmur heard.     No gallop.   Pulmonary:      Effort: Pulmonary effort is normal. No respiratory distress.      Breath sounds: Normal breath sounds. No stridor. No wheezing, rhonchi or rales.   Chest:      Chest wall: No tenderness.   Abdominal:      General: Bowel sounds are normal. There is no distension.      Palpations: Abdomen is soft. There is no mass.      Tenderness: There is no abdominal tenderness. There is no right CVA tenderness, left CVA tenderness, guarding or rebound.      Hernia: No hernia is present.   Musculoskeletal:         General: Normal range of motion.      Cervical back: Normal range of motion and neck supple.      Comments: Right volar forearm scar healing well without evidence of infection   Skin:     General: Skin is warm and dry.      Capillary Refill: Capillary refill takes less than 2 seconds.   Neurological:      General: No focal deficit present.      Mental Status: He is alert and oriented to person, place, and time.      Cranial Nerves: No cranial nerve deficit.      Sensory: No sensory deficit.      Motor: No weakness.      Coordination: Coordination normal.      Gait: Gait normal.      Deep Tendon Reflexes: Reflexes normal.   Psychiatric:      Comments: Depressed and anxious mood         Labs Reviewed   RAPID DRUG SCREEN, URINE - Abnormal       Result Value    Amph/Meth UR Negative      Barbiturate Ur Negative      Benzodiazepine Urine Negative      Cocaine Urine Negative      Methadone Urine Negative      Opiate Urine Negative      PCP Ur Negative      THC Urine Positive  (*)     Oxycodone Urine Negative      Fentanyl Urine Negative      HYDROCODONE URINE Negative      Narrative:     Presumptive report. If requested, specimen will be sent to reference lab for confirmation.  FOR MEDICAL PURPOSES ONLY.   IF CONFIRMATION NEEDED PLEASE CONTACT THE LAB WITHIN 5 DAYS.    Drug Screen Cutoff Levels:  AMPHETAMINE/METHAMPHETAMINES  1000 ng/mL  BARBITURATES     200 ng/mL  BENZODIAZEPINES     200 ng/mL  COCAINE      300 ng/mL  METHADONE      300 ng/mL  OPIATES      300 ng/mL  PHENCYCLIDINE     25 ng/mL  THC       50 ng/mL  OXYCODONE      100 ng/mL  FENTANYL      5 ng/mL  HYDROCODONE     300 ng/mL   CBC AND DIFFERENTIAL - Abnormal    WBC 6.92      RBC 5.70 (*)     Hemoglobin 17.1 (*)     Hematocrit 48.6      MCV 85      MCH 30.0      MCHC 35.2      RDW 11.9      MPV 10.2      Platelets 221      nRBC 0      Segmented % 57      Immature Grans % 1      Lymphocytes % 36      Monocytes % 5      Eosinophils Relative 1      Basophils Relative 0      Absolute Neutrophils 3.95      Absolute Immature Grans 0.04      Absolute Lymphocytes 2.46      Absolute Monocytes 0.36      Eosinophils Absolute 0.08      Basophils Absolute 0.03     TSH, 3RD GENERATION - Normal    TSH 3RD GENERATON 1.293     MEDICAL ALCOHOL - Normal    Ethanol Lvl <10     POCT ALCOHOL BREATH TEST - Normal    EXTBreath Alcohol 0.0     COMPREHENSIVE METABOLIC PANEL    Sodium 138      Potassium 4.1      Chloride 102      CO2 29      ANION GAP 7      BUN 17      Creatinine 0.92      Glucose 73      Calcium 10.0      AST 35      ALT 36      Alkaline Phosphatase 59      Total Protein 8.1      Albumin 4.7      Total Bilirubin 0.67      eGFR 114      Narrative:     National Kidney Disease Foundation guidelines for Chronic Kidney Disease (CKD):     Stage 1 with normal or high GFR (GFR > 90 mL/min/1.73 square meters)    Stage 2 Mild CKD (GFR = 60-89 mL/min/1.73 square meters)    Stage 3A Moderate CKD (GFR = 45-59 mL/min/1.73 square meters)    Stage  3B Moderate CKD (GFR = 30-44 mL/min/1.73 square meters)    Stage 4 Severe CKD (GFR = 15-29 mL/min/1.73 square meters)    Stage 5 End Stage CKD (GFR <15 mL/min/1.73 square meters)  Note: GFR calculation is accurate only with a steady state creatinine     No orders to display       Procedures       Fausto Reyna MD  09/11/24 2243
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
## 2024-09-12 ENCOUNTER — HOSPITAL ENCOUNTER (INPATIENT)
Facility: HOSPITAL | Age: 24
LOS: 7 days | Discharge: HOME | DRG: 753 | End: 2024-09-19
Attending: PSYCHIATRY & NEUROLOGY | Admitting: PSYCHIATRY & NEUROLOGY
Payer: COMMERCIAL

## 2024-09-12 DIAGNOSIS — G47.9 DIFFICULTY SLEEPING: ICD-10-CM

## 2024-09-12 DIAGNOSIS — R45.851 SUICIDAL IDEATIONS: ICD-10-CM

## 2024-09-12 DIAGNOSIS — F31.63 BIPOLAR DISORDER, CURRENT EPISODE MIXED, SEVERE, WITHOUT PSYCHOTIC FEATURES (HCC): Primary | ICD-10-CM

## 2024-09-12 DIAGNOSIS — Z00.8 ENCOUNTER FOR PSYCHOLOGICAL EVALUATION: ICD-10-CM

## 2024-09-12 PROBLEM — R09.89 SYMPTOMS OF UPPER RESPIRATORY INFECTION (URI): Status: ACTIVE | Noted: 2024-09-06

## 2024-09-12 PROBLEM — F84.0 AUTISM: Status: ACTIVE | Noted: 2024-09-12

## 2024-09-12 PROBLEM — R35.0 INCREASED FREQUENCY OF URINATION: Status: ACTIVE | Noted: 2024-09-06

## 2024-09-12 PROBLEM — Z20.2 STD EXPOSURE: Status: ACTIVE | Noted: 2024-06-04

## 2024-09-12 PROBLEM — R30.0 DIFFICULT OR PAINFUL URINATION: Status: ACTIVE | Noted: 2024-09-06

## 2024-09-12 PROBLEM — T14.91XA SUICIDAL BEHAVIOR WITH ATTEMPTED SELF-INJURY (HCC): Status: ACTIVE | Noted: 2024-09-12

## 2024-09-12 LAB
25(OH)D3 SERPL-MCNC: 58.4 NG/ML (ref 30–100)
ALBUMIN SERPL BCG-MCNC: 4.4 G/DL (ref 3.5–5)
ALP SERPL-CCNC: 43 U/L (ref 34–104)
ALT SERPL W P-5'-P-CCNC: 30 U/L (ref 7–52)
ANION GAP SERPL CALCULATED.3IONS-SCNC: 6 MMOL/L (ref 4–13)
AST SERPL W P-5'-P-CCNC: 27 U/L (ref 13–39)
BASOPHILS # BLD AUTO: 0.04 THOUSANDS/ΜL (ref 0–0.1)
BASOPHILS NFR BLD AUTO: 1 % (ref 0–1)
BILIRUB SERPL-MCNC: 0.79 MG/DL (ref 0.2–1)
BUN SERPL-MCNC: 17 MG/DL (ref 5–25)
CALCIUM SERPL-MCNC: 9.7 MG/DL (ref 8.4–10.2)
CHLORIDE SERPL-SCNC: 102 MMOL/L (ref 96–108)
CHOLEST SERPL-MCNC: 146 MG/DL
CO2 SERPL-SCNC: 31 MMOL/L (ref 21–32)
CREAT SERPL-MCNC: 0.99 MG/DL (ref 0.6–1.3)
EOSINOPHIL # BLD AUTO: 0.12 THOUSAND/ΜL (ref 0–0.61)
EOSINOPHIL NFR BLD AUTO: 2 % (ref 0–6)
ERYTHROCYTE [DISTWIDTH] IN BLOOD BY AUTOMATED COUNT: 11.9 % (ref 11.6–15.1)
FOLATE SERPL-MCNC: 12.3 NG/ML
GFR SERPL CREATININE-BSD FRML MDRD: 104 ML/MIN/1.73SQ M
GLUCOSE SERPL-MCNC: 86 MG/DL (ref 65–140)
HCT VFR BLD AUTO: 48 % (ref 36.5–49.3)
HDLC SERPL-MCNC: 41 MG/DL
HGB BLD-MCNC: 16.2 G/DL (ref 12–17)
IMM GRANULOCYTES # BLD AUTO: 0.02 THOUSAND/UL (ref 0–0.2)
IMM GRANULOCYTES NFR BLD AUTO: 0 % (ref 0–2)
LDLC SERPL CALC-MCNC: 95 MG/DL (ref 0–100)
LYMPHOCYTES # BLD AUTO: 2.49 THOUSANDS/ΜL (ref 0.6–4.47)
LYMPHOCYTES NFR BLD AUTO: 47 % (ref 14–44)
MCH RBC QN AUTO: 30.1 PG (ref 26.8–34.3)
MCHC RBC AUTO-ENTMCNC: 33.8 G/DL (ref 31.4–37.4)
MCV RBC AUTO: 89 FL (ref 82–98)
MONOCYTES # BLD AUTO: 0.35 THOUSAND/ΜL (ref 0.17–1.22)
MONOCYTES NFR BLD AUTO: 7 % (ref 4–12)
NEUTROPHILS # BLD AUTO: 2.31 THOUSANDS/ΜL (ref 1.85–7.62)
NEUTS SEG NFR BLD AUTO: 43 % (ref 43–75)
NONHDLC SERPL-MCNC: 105 MG/DL
NRBC BLD AUTO-RTO: 0 /100 WBCS
PLATELET # BLD AUTO: 212 THOUSANDS/UL (ref 149–390)
PMV BLD AUTO: 10.5 FL (ref 8.9–12.7)
POTASSIUM SERPL-SCNC: 4.1 MMOL/L (ref 3.5–5.3)
PROT SERPL-MCNC: 7.4 G/DL (ref 6.4–8.4)
RBC # BLD AUTO: 5.39 MILLION/UL (ref 3.88–5.62)
SODIUM SERPL-SCNC: 139 MMOL/L (ref 135–147)
TREPONEMA PALLIDUM IGG+IGM AB [PRESENCE] IN SERUM OR PLASMA BY IMMUNOASSAY: NORMAL
TRIGL SERPL-MCNC: 52 MG/DL
TSH SERPL DL<=0.05 MIU/L-ACNC: 1.61 UIU/ML (ref 0.45–4.5)
VIT B12 SERPL-MCNC: 1065 PG/ML (ref 180–914)
WBC # BLD AUTO: 5.33 THOUSAND/UL (ref 4.31–10.16)

## 2024-09-12 PROCEDURE — 82306 VITAMIN D 25 HYDROXY: CPT | Performed by: PSYCHIATRY & NEUROLOGY

## 2024-09-12 PROCEDURE — 84443 ASSAY THYROID STIM HORMONE: CPT | Performed by: PSYCHIATRY & NEUROLOGY

## 2024-09-12 PROCEDURE — 80053 COMPREHEN METABOLIC PANEL: CPT | Performed by: PSYCHIATRY & NEUROLOGY

## 2024-09-12 PROCEDURE — 80061 LIPID PANEL: CPT | Performed by: PSYCHIATRY & NEUROLOGY

## 2024-09-12 PROCEDURE — 99223 1ST HOSP IP/OBS HIGH 75: CPT | Performed by: HOSPITALIST

## 2024-09-12 PROCEDURE — 82607 VITAMIN B-12: CPT | Performed by: PSYCHIATRY & NEUROLOGY

## 2024-09-12 PROCEDURE — 85025 COMPLETE CBC W/AUTO DIFF WBC: CPT | Performed by: PSYCHIATRY & NEUROLOGY

## 2024-09-12 PROCEDURE — 82746 ASSAY OF FOLIC ACID SERUM: CPT | Performed by: PSYCHIATRY & NEUROLOGY

## 2024-09-12 PROCEDURE — 86780 TREPONEMA PALLIDUM: CPT | Performed by: PSYCHIATRY & NEUROLOGY

## 2024-09-12 RX ORDER — LORAZEPAM 2 MG/ML
2 INJECTION INTRAMUSCULAR
Status: DISCONTINUED | OUTPATIENT
Start: 2024-09-12 | End: 2024-09-13

## 2024-09-12 RX ORDER — LORAZEPAM 2 MG/ML
1 INJECTION INTRAMUSCULAR
Status: DISCONTINUED | OUTPATIENT
Start: 2024-09-12 | End: 2024-09-19 | Stop reason: HOSPADM

## 2024-09-12 RX ORDER — MAGNESIUM HYDROXIDE/ALUMINUM HYDROXICE/SIMETHICONE 120; 1200; 1200 MG/30ML; MG/30ML; MG/30ML
30 SUSPENSION ORAL EVERY 4 HOURS PRN
Status: DISCONTINUED | OUTPATIENT
Start: 2024-09-12 | End: 2024-09-19 | Stop reason: HOSPADM

## 2024-09-12 RX ORDER — BENZTROPINE MESYLATE 1 MG/1
1 TABLET ORAL
Status: DISCONTINUED | OUTPATIENT
Start: 2024-09-12 | End: 2024-09-19 | Stop reason: HOSPADM

## 2024-09-12 RX ORDER — GUAIFENESIN 600 MG/1
600 TABLET, EXTENDED RELEASE ORAL
Status: DISCONTINUED | OUTPATIENT
Start: 2024-09-12 | End: 2024-09-19 | Stop reason: HOSPADM

## 2024-09-12 RX ORDER — POLYETHYLENE GLYCOL 3350 17 G/17G
17 POWDER, FOR SOLUTION ORAL DAILY PRN
Status: DISCONTINUED | OUTPATIENT
Start: 2024-09-12 | End: 2024-09-19 | Stop reason: HOSPADM

## 2024-09-12 RX ORDER — BENZTROPINE MESYLATE 1 MG/ML
0.5 INJECTION, SOLUTION INTRAMUSCULAR; INTRAVENOUS
Status: DISCONTINUED | OUTPATIENT
Start: 2024-09-12 | End: 2024-09-19 | Stop reason: HOSPADM

## 2024-09-12 RX ORDER — IBUPROFEN 800 MG/1
800 TABLET, FILM COATED ORAL EVERY 8 HOURS PRN
Status: DISCONTINUED | OUTPATIENT
Start: 2024-09-12 | End: 2024-09-19 | Stop reason: HOSPADM

## 2024-09-12 RX ORDER — HALOPERIDOL 5 MG/ML
2.5 INJECTION INTRAMUSCULAR
Status: DISCONTINUED | OUTPATIENT
Start: 2024-09-12 | End: 2024-09-19 | Stop reason: HOSPADM

## 2024-09-12 RX ORDER — IBUPROFEN 600 MG/1
600 TABLET, FILM COATED ORAL EVERY 6 HOURS PRN
Status: DISCONTINUED | OUTPATIENT
Start: 2024-09-12 | End: 2024-09-19 | Stop reason: HOSPADM

## 2024-09-12 RX ORDER — LANOLIN ALCOHOL/MO/W.PET/CERES
3 CREAM (GRAM) TOPICAL
Status: DISCONTINUED | OUTPATIENT
Start: 2024-09-12 | End: 2024-09-19 | Stop reason: HOSPADM

## 2024-09-12 RX ORDER — LORAZEPAM 2 MG/ML
2 INJECTION INTRAMUSCULAR
Status: DISCONTINUED | OUTPATIENT
Start: 2024-09-12 | End: 2024-09-19 | Stop reason: HOSPADM

## 2024-09-12 RX ORDER — AMOXICILLIN 250 MG
1 CAPSULE ORAL DAILY PRN
Status: DISCONTINUED | OUTPATIENT
Start: 2024-09-12 | End: 2024-09-19 | Stop reason: HOSPADM

## 2024-09-12 RX ORDER — BENZTROPINE MESYLATE 1 MG/ML
1 INJECTION, SOLUTION INTRAMUSCULAR; INTRAVENOUS
Status: DISCONTINUED | OUTPATIENT
Start: 2024-09-12 | End: 2024-09-19 | Stop reason: HOSPADM

## 2024-09-12 RX ORDER — IBUPROFEN 400 MG/1
400 TABLET, FILM COATED ORAL EVERY 4 HOURS PRN
Status: DISCONTINUED | OUTPATIENT
Start: 2024-09-12 | End: 2024-09-19 | Stop reason: HOSPADM

## 2024-09-12 RX ORDER — PROPRANOLOL HCL 10 MG
10 TABLET ORAL EVERY 8 HOURS PRN
Status: DISCONTINUED | OUTPATIENT
Start: 2024-09-12 | End: 2024-09-19 | Stop reason: HOSPADM

## 2024-09-12 RX ORDER — HALOPERIDOL 5 MG/1
5 TABLET ORAL
Status: DISCONTINUED | OUTPATIENT
Start: 2024-09-12 | End: 2024-09-19 | Stop reason: HOSPADM

## 2024-09-12 RX ORDER — HALOPERIDOL 5 MG/ML
5 INJECTION INTRAMUSCULAR
Status: DISCONTINUED | OUTPATIENT
Start: 2024-09-12 | End: 2024-09-19 | Stop reason: HOSPADM

## 2024-09-12 RX ORDER — LORAZEPAM 1 MG/1
1 TABLET ORAL
Status: DISCONTINUED | OUTPATIENT
Start: 2024-09-12 | End: 2024-09-13

## 2024-09-12 RX ORDER — BISACODYL 10 MG
10 SUPPOSITORY, RECTAL RECTAL DAILY PRN
Status: DISCONTINUED | OUTPATIENT
Start: 2024-09-12 | End: 2024-09-19 | Stop reason: HOSPADM

## 2024-09-12 RX ORDER — TRAZODONE HYDROCHLORIDE 50 MG/1
50 TABLET, FILM COATED ORAL
Status: DISCONTINUED | OUTPATIENT
Start: 2024-09-12 | End: 2024-09-19 | Stop reason: HOSPADM

## 2024-09-12 RX ORDER — HALOPERIDOL 0.5 MG/1
1 TABLET ORAL EVERY 6 HOURS PRN
Status: DISCONTINUED | OUTPATIENT
Start: 2024-09-12 | End: 2024-09-19 | Stop reason: HOSPADM

## 2024-09-12 RX ADMIN — LORAZEPAM 1 MG: 1 TABLET ORAL at 20:27

## 2024-09-12 RX ADMIN — Medication 3 MG: at 20:27

## 2024-09-12 RX ADMIN — TRAZODONE HYDROCHLORIDE 50 MG: 50 TABLET ORAL at 01:38

## 2024-09-12 RX ADMIN — TRAZODONE HYDROCHLORIDE 50 MG: 50 TABLET ORAL at 20:27

## 2024-09-12 RX ADMIN — GUAIFENESIN 600 MG: 600 TABLET ORAL at 20:34

## 2024-09-12 RX ADMIN — LORAZEPAM 1 MG: 1 TABLET ORAL at 01:39
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
## 2024-09-12 NOTE — TREATMENT TEAM
09/12/24 0139   Kelley Anxiety Scale   Anxious Mood 3   Tension 3   Fears 3   Insomnia 1   Intellectual 2   Depressed Mood 3   Somatic Complaints: Muscular 1   Somatic Complaints: Sensory 1   Cardiovascular Symptoms 2   Respiratory Symptoms 2   Gastrointestinal Symptoms 0   Genitourinary Symptoms 0   Autonomic Symptoms 2   Behavior at Interview 3   Kelley Anxiety Score 26     Patient given Ativan 1mg at 0139 for severe anxiety about admission. Kelley score of 26. Will continue to monitor.  Medication noted as effective as patient is resting.

## 2024-09-12 NOTE — H&P
Nanci,#  :2000 AMADOU  MRN:[id number]    CSN:[id number]  Adm Date: 2024 0040  12:40 AM   ATT PHY: Rema Norton Md  [facility]         Chief Complaint: suicidal ideation      History of Presenting Illness: Bob Christine is a(n) 24 y.o. year old male who is admitted to [facility] on 201 volutnary commitment basis.  Patient originally presented to [facility] ED on  due to suicidal ideation.    Patient examined at bedside.  Patient denies any physical symptoms except R wrist pain when not wearing brace.      Labs normal.  Vitamins pending.     Allergies   Allergen Reactions    Amphetamine-Dextroamphetamine Other (See Comments)    Cat Hair Extract Other (See Comments)    Hydroxyzine Other (See Comments)    Pollen Extract Other (See Comments)       Current Facility-Administered Medications on File Prior to Encounter   Medication Dose Route Frequency Provider Last Rate Last Admin    [DISCONTINUED] LORazepam (ATIVAN) tablet 1 mg  1 mg Oral Q6H PRN Fausto Reyna MD   1 mg at 24 1506     No current outpatient medications on file prior to encounter.       Active Ambulatory Problems     Diagnosis Date Noted    Encounter for psychological evaluation 2024     Resolved Ambulatory Problems     Diagnosis Date Noted    No Resolved Ambulatory Problems     Past Medical History:   Diagnosis Date    ADHD     Anxiety        No past surgical history on file.    Social History:   Social History     Socioeconomic History    Marital status: Single     Spouse name: None    Number of children: None    Years of education: None    Highest education level: None   Occupational History    None   Tobacco Use    Smoking status: Never    Smokeless tobacco: Never   Substance and Sexual Activity    Alcohol use: Yes    Drug use: Yes     Types: LSD, Other, Marijuana, Benzodiazepines     Comment: THENZODIAZAPINE    Sexual  activity: None   Other Topics Concern    None   Social History Narrative    None     Social Determinants of Health     Financial Resource Strain: Not on file   Food Insecurity: Not on file   Transportation Needs: Not on file   Physical Activity: Not on file   Stress: Not on file   Social Connections: Unknown (6/18/2024)    Received from [employer]     How often do you feel lonely or isolated from those around you? (Adult - for ages 18 years and over): Not on file   Intimate Partner Violence: Not on file   Housing Stability: Not on file       Family History: History reviewed. No pertinent family history.    Review of Systems   Constitutional:  Negative for chills, fatigue and fever.   HENT: Negative.     Eyes: Negative.    Respiratory:  Positive for cough. Negative for shortness of breath.    Cardiovascular:  Negative for chest pain and palpitations.   Gastrointestinal:  Negative for abdominal pain, constipation, diarrhea and nausea.   Genitourinary: Negative.    Musculoskeletal:         R wrist pain with flexion and extension.   Skin: Negative.    Neurological:  Negative for dizziness, light-headedness and headaches.       Physical Exam   Vitals: Blood pressure 128/80, pulse 83, temperature 98.1 °F (36.7 °C), temperature source Temporal, resp. rate 18, height 6' (1.829 m), weight 106 kg (234 lb 6.4 oz), SpO2 97%.,Body mass index is 31.79 kg/m².    Constitutional: Awake, alert, in no acute distress.  Head: Normocephalic and atraumatic.   Mouth/Throat: Oropharynx is clear and moist.    Eyes: Conjunctivae and EOM are normal.   Neck: Neck supple. No thyromegaly present.   Cardiovascular: Normal rate, regular rhythm and normal heart sounds.    Pulmonary/Chest: Effort normal and breath sounds normal.   Abdominal: Soft. Bowel sounds are normal. There is no tenderness. There is no rebound and no guarding.   Neurological: No focal deficits.   Musculoskeletal:  Nontender spine.  Skin: Skin is warm and  dry. No edema.     Assessment     Bob Christine is a(n) 24 y.o. male with MDD.    Hx Kienbock's disease/R wrist pain.  Recent hx of R arm/wrist surgery.  Continue wearing R wrist brace.  Ibuprofen/tylenol for pain.  OT consulted.   Cough.  Recent URI.  Mucinex at bedtime.   Arthralgias/HA.  Ibuprofen as needed.   Insomnia.  Patient on melatonin 3 mg daily at bedtime.   MDD.  Managed by psych.       Prognosis: Fair.    Discharge Plan: In progress.    Advanced Directives: I have discussed in detail with the patient the advanced directives. The patient does not have an appointed POA or living will. Emergency contact is mother, Anu Wei, [phone]. When discussing cardiac and pulmonary resuscitation efforts with the patient, the patient wishes to be  FULL CODE.    I have spent more than 50 minutes gathering data, doing physical examination, and discussing the advanced directives, which was witnessed by caring staff.    The patient was discussed with Dr. Sood and he is in agreement with the above note.

## 2024-09-12 NOTE — PROGRESS NOTES
The pt has the following belongings at bedside:  1x pants  1 pair of socks  1x shirt     The following were sent to locker 4:  1x belt  1x pair of sneakers  1x metal splint to wrist brace    The following were sent to security bag [id number]:  1 [place] drivers license   1X Insurance card  1x [employer] credit card  1x wallet    The following were sent to [employer]    1 cell phone, screen intact   1 wrist brace       The pt came with no medications    The patient was present and signed for belongings

## 2024-09-12 NOTE — NURSING NOTE
Patient slept uninterrupted after admission process without signs or symptoms of distress. Patient was given PRN trazodone. Non labored breathing noted. Continuous q7 minute checks ongoing.
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
## 2024-09-12 NOTE — DISCHARGE INSTR - APPOINTMENTS
You are being discharged to your confirmed address of [address] [place] [id number].      You will be contacted at your confirmed phone number of [phone].    Jennifer our Behavioral Health Nurse Navigator, will be calling you after your discharge, on the phone number that you provided.  She will be available as an additional support, if needed.   If you wish to speak with Jennifer, you may contact her at [phone].
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
## 2024-09-12 NOTE — CASE MANAGEMENT
CM sent in a Work Order for patient to receive outpatient services at [facility] upon discharge.
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
## 2024-09-12 NOTE — ED NOTES
Report and belongings including original 201 given to transport     Nadia Benítez RN  09/11/24 2211

## 2024-09-12 NOTE — EMTALA/ACUTE CARE TRANSFER
[facility] EMERGENCY DEPARTMENT  [address] [facility] [address]  Dept: [phone]      EMTALA TRANSFER CONSENT    NAME Bob MÉNDEZ 2000                              MRN [id number]    I have been informed of my rights regarding examination, treatment, and transfer   by Dr. Fausto Reyna MD    Benefits: Specialized equipment and/or services available at the receiving facility (Include comment)________________________    Risks: Potential deterioration of medical condition, Potential for delay in receiving treatment, Increased discomfort during transfer, Possible worsening of condition or death during transfer      Consent for Transfer:  I acknowledge that my medical condition has been evaluated and explained to me by the emergency department physician or other qualified medical person and/or my attending physician, who has recommended that I be transferred to the service of  Accepting Physician: Dr. Norton at Accepting Facility Name, City & State : [facility]. The above potential benefits of such transfer, the potential risks associated with such transfer, and the probable risks of not being transferred have been explained to me, and I fully understand them.  The doctor has explained that, in my case, the benefits of transfer outweigh the risks.  I agree to be transferred.    I authorize the performance of emergency medical procedures and treatments upon me in both transit and upon arrival at the receiving facility.  Additionally, I authorize the release of any and all medical records to the receiving facility and request they be transported with me, if possible.  I understand that the safest mode of transportation during a medical emergency is an ambulance and that the Hospital advocates the use of this mode of transport. Risks of traveling to the receiving facility by car, including absence of medical control, life sustaining equipment,  such as oxygen, and medical personnel has been explained to me and I fully understand them.    (DEBBIE CORRECT BOX BELOW)  [  ]  I consent to the stated transfer and to be transported by ambulance/helicopter.  [  ]  I consent to the stated transfer, but refuse transportation by ambulance and accept full responsibility for my transportation by car.  I understand the risks of non-ambulance transfers and I exonerate the Hospital and its staff from any deterioration in my condition that results from this refusal.    X___________________________________________    DATE  24  TIME________  Signature of patient or legally responsible individual signing on patient behalf           RELATIONSHIP TO PATIENT_________________________          Provider Certification    NAME Bob Christine                                        [facility] 2000                              MRN [id number]    A medical screening exam was performed on the above named patient.  Based on the examination:    Condition Necessitating Transfer The primary encounter diagnosis was Suicidal ideations. A diagnosis of Depression, unspecified depression type was also pertinent to this visit.    Patient Condition: The patient has been stabilized such that within reasonable medical probability, no material deterioration of the patient condition or the condition of the unborn child(vasu) is likely to result from the transfer    Reason for Transfer: Level of Care needed not available at this facility    Transfer Requirements: Facility L   Space available and qualified personnel available for treatment as acknowledged by    Agreed to accept transfer and to provide appropriate medical treatment as acknowledged by       Dr. Norton  Appropriate medical records of the examination and treatment of the patient are provided at the time of transfer   STAFF INITIAL WHEN COMPLETED _______  Transfer will be performed by qualified personnel from    and appropriate transfer  equipment as required, including the use of necessary and appropriate life support measures.    Provider Certification: I have examined the patient and explained the following risks and benefits of being transferred/refusing transfer to the patient/family:  Unanticipated needs of medical equipment and personnel during transport, General risk, such as traffic hazards, adverse weather conditions, rough terrain or turbulence, possible failure of equipment (including vehicle or aircraft), or consequences of actions of persons outside the control of the transport personnel, Risk of worsening condition, The possibility of a transport vehicle being unavailable      Based on these reasonable risks and benefits to the patient and/or the unborn child(vasu), and based upon the information available at the time of the patient’s examination, I certify that the medical benefits reasonably to be expected from the provision of appropriate medical treatments at another medical facility outweigh the increasing risks, if any, to the individual’s medical condition, and in the case of labor to the unborn child, from effecting the transfer.    X____________________________________________ DATE 09/11/24        TIME_______      ORIGINAL - SEND TO MEDICAL RECORDS   COPY - SEND WITH PATIENT DURING TRANSFER

## 2024-09-12 NOTE — TREATMENT PLAN
TREATMENT PLAN REVIEW - Behavioral Health Bob Christine 24 y.o. 2000 male MRN: [id number]    [facility] BEHAVIORAL HEALTH Room / Bed: [facility] 218/[facility] 218-02 Encounter: [id number]          Admit Date/Time:  9/12/2024 12:40 AM    Treatment Team:   MD Sommer Nava, SHANDRA Tello RN Nelly Vesga Kaleyann Doyle Leah Miller Christina Ayers    Diagnosis: Principal Problem:    Suicidal behavior with attempted self-injury (HCC)  Active Problems:    Autism      Patient Strengths/Assets: ability for insight, average or above intelligence, cooperative, communication skills, family ties, good support system, negotiates basic needs, patient is on a voluntary commitment, supportive family/friends, work skills    Patient Barriers/Limitations: difficulty adapting, limited education, noncompliant with medication, poor interpersonal skills, poor reasoning ability, resistance to treatment    Short Term Goals: decrease in suicidal thoughts    Long Term Goals: free of suicidal thoughts, acceptance of need for psychiatric medications, acceptance of need for psychiatric treatment, acceptance of need for psychiatric follow up after discharge, acceptance of psychiatric diagnosis, improve frustration tolerance    Progress Towards Goals: starting psychiatric medications as prescribed    Recommended Treatment: medication management, patient medication education, group therapy, milieu therapy, continued Behavioral Health psychiatric evaluation/assessment process    Treatment Frequency: daily medication monitoring, group and milieu therapy daily, monitoring through interdisciplinary rounds, monitoring through weekly patient care conferences    Expected Discharge Date:  September 19, 2024    Discharge Plan: discharge to home    Treatment Plan Created/Updated By: Charisse Gordon MD

## 2024-09-12 NOTE — PLAN OF CARE
Problem: DISCHARGE PLANNING - CARE MANAGEMENT  Goal: Discharge to post-acute care or home with appropriate resources  Description: INTERVENTIONS:  - Conduct assessment to determine patient/family and health care team treatment goals, and need for post-acute services based on payer coverage, community resources, and patient preferences, and barriers to discharge  - Address psychosocial, clinical, and financial barriers to discharge as identified in assessment in conjunction with the patient/family and health care team  - Arrange appropriate level of post-acute services according to patient’s   needs and preference and payer coverage in collaboration with the physician and health care team  - Communicate with and update the patient/family, physician, and health care team regarding progress on the discharge plan  - Arrange appropriate transportation to post-acute venues  Outcome: Progressing   - CM met with patient and went over Intake and IVETT's.    Pt is a 201

## 2024-09-12 NOTE — ED NOTES
Insurance Authorization for admission:   Phone call placed to [facility].  Phone number: [phone].     Spoke to [facility].     5 days approved.  Level of care: IP Psych 201.  Review on 9/16.   Authorization # [id number].    *Per Kalin, Pt insurance will be terming on 9/30

## 2024-09-12 NOTE — DISCHARGE INSTR - OTHER ORDERS
You are being discharged to [address] [place] [id number]. Patient phone     Triggers you have identified during your hospitalization that led to your admission include a distressed mood and ineffective coping skills. Coping skills you have identified during your hospitalization include music and art therapy. If you are unable to deal with your distressed mood alone please contact INSERT. If that is not effective and you continue to have a distressed mood, are overwhelmed, or are in crisis please contact (Crisis #) [phone], dial 911 or go to the nearest emergency center.      *[facility] Crisis Hotline: 240.752.9941  *National Suicide Prevention Lifeline:  1-407.353.9521  *Alcohol Anonymous: 481.921.9439  *[facility] Drug & Alcohol Commission: (324) 457-5547  *National [place] on Mental Illness (RON) HELPLINE: 548.748.5690/Website: www.ron.org  *Substance Abuse and Mental Health Services Administration([facility]) National Helpline, which is a confidential, free, 24-hour-a-day, 365-day-a-year, information service for individuals and family members facing mental health and/or substance use disorders. This service provides referrals to local treatment facilities, support groups, and community-based organizations. Callers can also order free publications and other information.  Call 1-397.780.2448/Website: www.[facility].gov  *[facility] 2-1-1: This is a toll free, confidential, 24-hour-a-day service which connects you to a community  in your area who can help you find services and resources that are available to you locally and provide critical services that can improve and save lives.  Call: 211  /Website: http://www.[employer] St[employer]org/       Jennifer our Behavioral Health Nurse Navigator, will be calling you after your discharge, on the phone number that you provided.  She will be available as an additional support, if needed.   If you wish to speak with Jennifer, you may contact her at  [phone].    Crisis Information  Emergency Crisis Services: ([phone]) [phone]  After hours (4:30 p.m.-8:30 a.m. and weekends/holidays):  (377) [phone] or (473) [phone]  Emergency crisis services are available 24 hours a day, 7 days a week, and 365 days a year. Walk-ins go to the rear of [address] [place] [address] (behind [facility] [address] on [facility] ).  Need Help Now?  If you or someone you care about is having a problem with drugs or alcohol, there are several ways to help:  The [facility] Drug & Alcohol Program can help walk you through your options for getting help. You can get further information by calling [phone], Monday through Friday, 8:30 AM to 4:30 PM.  If it is after hours or a holiday, you can contact [facility] Help at 5-254-7OL HELP (1-456.835.8985) for further assistance. If it is after hours and a medical emergency you should seek help from your local hospital emergency room.  If you have Health Insurance or Medical Assistance, you can get further information on accessing treatment by contacting the number on the back of your card listed under “Behavioral Health or Substance Abuse Services”.  For further information on available services, refer to [place] Department of Drug & Alcohol Programs website at http://www.ddap.pa.gov/gethelpnow  [facility] Drug & Alcohol Program Assessment Providers:  [place] D&A Commission  [phone] Fax  [address] [facility], [address]  [place] [address] [facility]  [phone] Fax  [address] [address]   [place]  [phone] Fax  [address]th [place] [address] [facility]  [phone] Fax  [place] [address] [place] [address]   [facility]  [phone]  [phone]  Fax  [address] [place] [address] [facility]  [phone] Fax  [address] [place] [address]   [facility]  [phone] Fax  [address] [place] [address]       Medication-Assisted Treatment  Medication-assisted treatment (MAT) is the use of medications, in combination with counseling and behavioral therapy, which is effective in the treatment of opioid use disorders (OUD) and can help some people to sustain recovery       Local MAT providers:  [facility]  [facility]    What can you do?  Reach out, if you think someone you know has a problem. Talk to family members, friends, or a health care professional. The earlier treatment begins, the better outcomes are likely to be.  Understand that treatment is effective and substance use disorders can be effectively treated with behavioral therapies.    From the [facility] website www.[place].gov/guides/opioid-epidemic/#GetNaloxone    How do I get naloxone?  Family members and friends can access naloxone by:    Obtaining a prescription from their family doctor  Using the standing order issued by [facility] Phys[facility] General Norma Souza. A standing order is a prescription written for the general public, rather than specifically for an individual.  To use the standing order, print it and take it with you to the pharmacy or have the digital version on your phone. Download the standing order from the Department of Health (PDF).    If you are unable to print it or use the digital version, the standing order is kept on file at many pharmacies. If a pharmacy does not have it on file, they may have the ability to look it up.    Naloxone prescriptions can be filled at most pharmacies. Although the medication might not be available for same-day pickup, it often can be ordered and available within a day or two.Behavioral Health Services In  [facility]    [facility]  [address]     [address] [facility] 3rd Floor  [place], [place] [address]    [address] [place] [address]  [phone]  Child and Family Support Services  [place]  [address] [facility]    [address] [place] [address]     [address] 2nd Floor  [phone]      [place] [address]  [phone]  [facility]  [address]     [address] [place] [address]    [place] [address]  [phone]  [facility]   [facility]  [address] [facility]     [address] [place] [address]     [place] [address]  [phone]    For private insurance provider patients refer to the number on their care for a list of behavioral health providers in  Network.    Crisis intervention services    Provides intervention on a emergency basis via telephone and mobile.    [facility] Crisis: [phone]    [facility] Crisis Services: 1-485.654.2901        The National [place] on Mental Illness (RON) offers various education & support groups for you & your family.  For more information visit their website at    http://www.ron-lv.org/.      Dial 2-1-1 to get connected/get help.  Free, confidential information & referral available 24/7: Aging Services, Child & Youth Services, Counseling, Education/Training, Food/Shelter/Clothing, Health Services, Parenting, Substance Abuse, Support Groups, Volunteer Opportunities, & much more.   Phone: 2-1-1 or 756-540-5320, Web: www.Sa[employer].org, Email: 019@[facility].org      Text CONNECT to 931818 from anywhere in the [place], anytime, about any type of crisis.  A live, trained Crisis Counselor receives the text and lets you know that they are here to listen.  The volunteer Crisis Counselor will help you move from a hot moment to a cool moment.      Warm Line: (182)  976-7558, ([phone], ([phone]   If it is not quite a crisis, but you want to talk to someone, 24 hours/day, 7 days/week:   Someone to listen; someone who cares.

## 2024-09-12 NOTE — LETTER
[facility]  [address] [facility] [address]  [phone]  Dept: [phone]    September 18, 2024     Patient: Bob Christine   YOB: 2000   Date of Visit: 9/12/2024       To Whom it May Concern:    Bob Christine is under my professional care. He was seen in the hospital from 9/12/2024 to 9/19/24. He may return to work on 9/24/24 without limitations.    If you have any questions or concerns, please don't hesitate to call.         Sincerely,          CINDI Hidalgo

## 2024-09-12 NOTE — PROGRESS NOTES
Met with oBb completed his admission self assessment. He has spiritual beliefs and has used psychodelic's to avoid reality. He knows his THC use only makes him more paranoid. He struggles with trusting others, he states his stressors are financial, working trying to please others, relationship issues. He has activities he enjoys. He would like to learn how to cope with his mental health and to have healthier living.

## 2024-09-13 ENCOUNTER — TELEPHONE (OUTPATIENT)
Age: 24
End: 2024-09-13

## 2024-09-13 PROCEDURE — 99232 SBSQ HOSP IP/OBS MODERATE 35: CPT | Performed by: HOSPITALIST

## 2024-09-13 RX ORDER — DIVALPROEX SODIUM 500 MG/1
1000 TABLET, FILM COATED, EXTENDED RELEASE ORAL
Status: DISCONTINUED | OUTPATIENT
Start: 2024-09-13 | End: 2024-09-15

## 2024-09-13 RX ORDER — LORAZEPAM 0.5 MG/1
0.5 TABLET ORAL EVERY 8 HOURS PRN
Status: DISCONTINUED | OUTPATIENT
Start: 2024-09-13 | End: 2024-09-16

## 2024-09-13 RX ORDER — LORAZEPAM 2 MG/ML
2 INJECTION INTRAMUSCULAR
Status: DISCONTINUED | OUTPATIENT
Start: 2024-09-13 | End: 2024-09-16

## 2024-09-13 RX ADMIN — AMOXICILLIN AND CLAVULANATE POTASSIUM 1 TABLET: 875; 125 TABLET, FILM COATED ORAL at 14:37

## 2024-09-13 RX ADMIN — AMOXICILLIN AND CLAVULANATE POTASSIUM 1 TABLET: 875; 125 TABLET, FILM COATED ORAL at 20:40

## 2024-09-13 RX ADMIN — TRAZODONE HYDROCHLORIDE 50 MG: 50 TABLET ORAL at 20:40

## 2024-09-13 RX ADMIN — GUAIFENESIN 600 MG: 600 TABLET ORAL at 20:41

## 2024-09-13 NOTE — ASSESSMENT & PLAN NOTE
Patient was given information about Depakote, will start medication upon patient's agreement.  Maintaining safety with no threats or gestures of self-harm.

## 2024-09-13 NOTE — PLAN OF CARE
Problem: Risk for Self Injury/Neglect  Goal: Attend and participate in unit activities, including therapeutic, recreational, and educational groups  Description: Interventions:  - Provide therapeutic and educational activities daily, encourage attendance and participation, and document same in the medical record  - Obtain collateral information, encourage visitation and family involvement in care   Outcome: Not Progressing     Problem: Ineffective Coping  Goal: Participates in unit activities  Description: Interventions:  - Provide therapeutic environment   - Provide required programming   - Redirect inappropriate behaviors   Outcome: Not Progressing

## 2024-09-13 NOTE — NURSING NOTE
Patient noted to be visible and social on the milieu. Pleasant and cooperative throughout interaction.  Presents as anxious during assessment. Denied SI/HI/AVH. Endorsed mild to moderate depression and moderate to severe anxiety. Compliant w/ hs med regimen. Q 7 min safety checks continuous and maintained.

## 2024-09-13 NOTE — PROGRESS NOTES
09/13/24    Team Meeting   Meeting Type Daily Rounds   Team Members Present   Team Members Present Physician;Nurse;;Occupational Therapist   Physician Team Member Newton LAKE, MD Brenda, Deepti BUSCH   Nursing Team Member Delta DEVI   Social Work Team Member Gustavo PUENTE   OT Team Member Pope MEDINA   Patient/Family Present   Patient Present No   Patient's Family Present No     201, no dc date due to med management, anxious on unit, visible, adjusting well, ativan prn last night due to unit stimulation, anxious, autism, very dramatic, over the top when speaking, impulsive, meds adjusted, would like to start on depakote, knowledgeable on meds

## 2024-09-13 NOTE — PLAN OF CARE
Problem: Ineffective Coping  Goal: Identifies ineffective coping skills  Outcome: Progressing     Problem: Risk for Self Injury/Neglect  Goal: Verbalize thoughts and feelings  Description: Interventions:  - Assess and re-assess patient's lethality and potential for self-injury  - Engage patient in 1:1 interactions, daily, for a minimum of 15 minutes  - Encourage patient to express feelings, fears, frustrations, hopes  - Establish rapport/trust with patient   Outcome: Progressing  Goal: Refrain from harming self  Description: Interventions:  - Monitor patient closely, per order  - Develop a trusting relationship  - Supervise medication ingestion, monitor effects and side effects   Outcome: Progressing

## 2024-09-13 NOTE — NURSING NOTE
"Patient noted to to be increasingly anxious evidenced by increased restlessness and vocalizing issues w/ \"racing thoughts\" Patient stated \"I am feeling more anxious as I am standing here.\" Utilized prn Ativan 1 mg for frey anxiety score of 26. Will monitor and follow up for effectiveness.   "

## 2024-09-13 NOTE — TELEPHONE ENCOUNTER
[facility]< to MITCHELL Izaguirre:    Good Morning,     I know someone messaged you for a D/C date.    I scheduled pt for 9/18 @ 300 with Dr. Gold in [place] for Medication Management, this is the soonest available for MM.    There is no TT avail at any of our locations ar this time.

## 2024-09-13 NOTE — PROGRESS NOTES
09/13/24 1152   Team Meeting   Meeting Type Tx Team Meeting   Initial Conference Date 09/13/24   Next Conference Date 10/13/24   Team Members Present   Team Members Present Physician;Nurse;   Physician Team Member Dr. Sanchez   Nursing Team Member Florinda Sorenson RN   Care Management Team Member Krys Phillips RN   Patient/Family Present   Patient Present Yes   Patient's Family Present No     Initial Plan  Treatment Team met and reviewed patient strengths, limitations, coping skills, Treatment Plan and Goals; patient reported understanding and agreement and signed the Treatment Plan document. A copy was placed on the chart.

## 2024-09-13 NOTE — ASSESSMENT & PLAN NOTE
Patient agreeable to initiate Depakote ER 1000 mg PO QD after dinner for mood control, impulsivity, and irritability preceding admission.  Obtain Depakote level 9/16/2024 at 5 PM.

## 2024-09-13 NOTE — PROGRESS NOTES
Progress Note - Behavioral Health   Name: Bob Christine 24 y.o. male I MRN: [id number]  Unit/Bed#: -02 I Date of Admission: 9/12/2024   Date of Service: 9/13/2024 I Hospital Day: 1    Assessment & Plan  Suicidal behavior with attempted self-injury (HCC)  Patient was given information about Depakote, will start medication upon patient's agreement.  Maintaining safety with no threats or gestures of self-harm.  Autism    Bipolar disorder, current episode mixed, severe, without psychotic features (HCC)  Patient agreeable to initiate Depakote ER 1000 mg PO QD after dinner for mood control, impulsivity, and irritability preceding admission.  Obtain Depakote level 9/16/2024 at 5 PM.    Progress Toward Goals: Continues to endorse mood lability, irritability, and difficulty in maintaining emotional regulation in the outpatient setting.  Endorses episodes of paranoia and anxiety.  Resistant against initiating medication, however was receptive to medication education and agreeable to initiate Depakote ER 1000 mg PO QD stabilization.  Insight is partial.  Has been maintaining safety on unit.  No discharge date at this time.    Recommended Treatment: Continue with group therapy, milieu therapy and occupational therapy.      Risks, benefits and possible side effects of Medications:   Bob has limited understanding of risk versus benefits of medications, but agrees to take as prescribed.    History of Present Illness   Behavior over the last 24 hours:  unchanged  Sleep: PRN Trazodone effective  Appetite: normal  Medication side effects: No  ROS: no complaints and all other systems are negative    Subjective: Bob has been visible and engaged in milieu activities.  Presents as anxious and ruminating regarding medications.  Describes having significant mood dysregulation, irritability, and impulsivity outside of the hospital prompting hospitalization.  Maintaining safety with no SI.  Behavior is childlike, but  "redirectable.  Speech hyperverbal.  Believes his mood is \"better\" however presents as anxious with underlying irritable edge.  Exhibits partial insight and judgment. PRN trazodone effective last evening for sleep.    Objective   Mental Status Evaluation:  Appearance:  overweight and blonde hair, unkempt, wearing paper scrubs   Behavior:  restless and fidgety   Speech:  Hyperverbal   Mood:  \"Better\"   Affect:  Anxious, underlying irritability   Thought Process:  circumstantial   Associations: perseverative   Thought Content:  Some ruminations   Perceptual Disturbances: Denies AVH, did not appear internally preoccupied   Risk Potential: Suicidal Ideations none  Homicidal Ideations none  Potential for Aggression Not at present   Sensorium:  person, place, time/date, and situation   Memory:  recent and remote memory grossly intact   Consciousness:  alert and awake    Attention: attention span appeared shorter than expected for age   Insight:  Partial   Judgment: Partial   Gait/Station: normal gait/station and normal balance   Motor Activity: no abnormal movements     Medications: all current active meds have been reviewed, continue current psychiatric medications, and planned medication changes: Start Depakote ER 1000 mg PO QD after diner; obtain VPA level 9/16/24 at 5 PM .      Lab Results: I have reviewed the following results:   CMP:   Lab Results   Component Value Date    SODIUM 139 09/12/2024    K 4.1 09/12/2024     09/12/2024    CO2 31 09/12/2024    AGAP 6 09/12/2024    BUN 17 09/12/2024    CREATININE 0.99 09/12/2024    GLUC 86 09/12/2024    CALCIUM 9.7 09/12/2024    AST 27 09/12/2024    ALT 30 09/12/2024    ALKPHOS 43 09/12/2024    TP 7.4 09/12/2024    ALB 4.4 09/12/2024    TBILI 0.79 09/12/2024    EGFR 104 09/12/2024     Drug Screen:   Lab Results   Component Value Date    AMPMETHUR Negative 09/11/2024    BARBTUR Negative 09/11/2024    BDZUR Negative 09/11/2024    THCUR Positive (A) 09/11/2024    COCAINEUR " Negative 09/11/2024    METHADONEUR Negative 09/11/2024    OPIATEUR Negative 09/11/2024    PCPUR Negative 09/11/2024       Administrative Statements   I have spent a total time of 30 minutes in caring for this patient on the day of the visit/encounter including Risks and benefits of tx options, Instructions for management, Patient and family education, Importance of tx compliance, Risk factor reductions, Counseling / Coordination of care, Documenting in the medical record, and Reviewing / ordering tests, medicine, procedures  . Topics discussed with the patient / family include medication education, treatment plan, safety planning.

## 2024-09-13 NOTE — PROGRESS NOTES
Progress Note - Bob Christine 24 y.o. male MRN: [id number]    Unit/Bed#: [facility] 218-02 Encounter: [id number]        Subjective:   Patient seen and examined at bedside after reviewing the chart and discussing the case with the caring staff.      Patient examined at bedside.  Patient reports continued cough and congestion.  Patient was diagnosed with URI at urgent care on 9/6.  Reports he was given an antibiotic but only took about 3 days of it.   Unable to find in chart review.      Vit D, B12, and folic acid normal.     Physical Exam   Vitals: Blood pressure 109/69, pulse 72, temperature 97.8 °F (36.6 °C), temperature source Temporal, resp. rate 18, height 6' (1.829 m), weight 106 kg (234 lb 6.4 oz), SpO2 97%.,Body mass index is 31.79 kg/m².  Constitutional: Patient in no acute distress.  HEENT: PERR, EOMI, MMM.  Cardiovascular: Normal rate and regular rhythm.    Pulmonary/Chest: Effort normal and breath sounds normal.   Abdomen: Soft, + BS, NT.    Assessment/Plan:  Bob Christine is a(n) 24 y.o. male with MDD.     Hx Kienbock's disease/R wrist pain.  Recent hx of R arm/wrist surgery.  Continue wearing R wrist brace.  Ibuprofen/tylenol for pain.  OT consulted.   Acute sinusitis/cough.  Start Augmentin 875-125 mg twice daily x 5 days (9/13).  Mucinex at bedtime.   Arthralgias/HA.  Ibuprofen as needed.   Insomnia.  Patient on melatonin 3 mg daily at bedtime.    The patient was discussed with Dr. Sood and he is in agreement with the above note.

## 2024-09-13 NOTE — NURSING NOTE
New orders to start Augmentin for treatment of URI. Per medical provider patient never completed course of antibiotics prior to admission. Administered first dose as prescribed. Pt verbalized understanding. Safety precautions maintained. Will continue to monitor and assess.

## 2024-09-13 NOTE — NURSING NOTE
Prn Ativan given noted to be effective as patient noted to be more visibly calm and vocalized relief.    30-Sep-2022 20:00

## 2024-09-14 LAB
ATRIAL RATE: 70 BPM
P AXIS: 32 DEGREES
PR INTERVAL: 162 MS
QRS AXIS: 18 DEGREES
QRSD INTERVAL: 100 MS
QT INTERVAL: 392 MS
QTC INTERVAL: 423 MS
T WAVE AXIS: 42 DEGREES
VENTRICULAR RATE: 70 BPM

## 2024-09-14 PROCEDURE — 93005 ELECTROCARDIOGRAM TRACING: CPT

## 2024-09-14 PROCEDURE — 93010 ELECTROCARDIOGRAM REPORT: CPT | Performed by: INTERNAL MEDICINE

## 2024-09-14 PROCEDURE — 99232 SBSQ HOSP IP/OBS MODERATE 35: CPT | Performed by: STUDENT IN AN ORGANIZED HEALTH CARE EDUCATION/TRAINING PROGRAM

## 2024-09-14 RX ORDER — SACCHAROMYCES BOULARDII 250 MG
250 CAPSULE ORAL 2 TIMES DAILY
Status: DISCONTINUED | OUTPATIENT
Start: 2024-09-14 | End: 2024-09-19 | Stop reason: HOSPADM

## 2024-09-14 RX ADMIN — Medication 3 MG: at 22:19

## 2024-09-14 RX ADMIN — AMOXICILLIN AND CLAVULANATE POTASSIUM 1 TABLET: 875; 125 TABLET, FILM COATED ORAL at 09:32

## 2024-09-14 RX ADMIN — TRAZODONE HYDROCHLORIDE 50 MG: 50 TABLET ORAL at 22:19

## 2024-09-14 RX ADMIN — AMOXICILLIN AND CLAVULANATE POTASSIUM 1 TABLET: 875; 125 TABLET, FILM COATED ORAL at 21:59

## 2024-09-14 RX ADMIN — Medication 250 MG: at 17:44

## 2024-09-14 RX ADMIN — GUAIFENESIN 600 MG: 600 TABLET ORAL at 21:59

## 2024-09-14 NOTE — PROGRESS NOTES
Progress Note - Behavioral Health   Name: Bob Christine 24 y.o. male I MRN: [id number]  Unit/Bed#: -02 I Date of Admission: 9/12/2024   Date of Service: 9/14/2024 I Hospital Day: 2    Assessment & Plan  Suicidal behavior with attempted self-injury (HCC)  Maintaining safety with no threats or gestures of self-harm.  Autism    Bipolar disorder, current episode mixed, severe, without psychotic features (HCC)  Patient was previosly given information about Depakote,was ought to be initiated Depakote ER 1000 mg PO QD after dinner yesterday night for mood control, impulsivity, and irritability preceding admission and had was planned to obtain Depakote level 9/16/2024 at 5 PM. Patient refused yesterday nights Depakote.    Progress Toward Goals: Patient benefiting from the coping skills of group therapy.  Due to concerns of hepatotoxicity and infertility, patient currently refuses Depakote.  Alternative mood stabilizers such as lithium and lamotrigine were discussed.  Patient understands the importance of mood stabilizers and how it can help with future manic episodes, is open to future alternative mood stabilizers, although chooses to just continue with the coping skills for now.    Recommended Treatment: Continue with group therapy, milieu therapy and occupational therapy.      Risks, benefits and possible side effects of Medications:   Risks, benefits, and possible side effects of medications explained to patient and patient verbalizes understanding.      History of Present Illness   Behavior over the last 24 hours:  improved  Sleep: normal  Appetite: normal  Medication side effects: No  ROS: Right wrist pain post surgery    Subjective: Patient seen in the office, calm, positive towards his mental issues and is feeling ready for discharge. He mentions that he was socially active here and attending groups, finding them beneficial, wanting to help other peers and psychiatric needs when he discharges.   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    Objective   Mental Status Evaluation:  Appearance:  age appropriate and casually dressed   Behavior:  normal   Speech:  normal pitch and normal volume   Mood:  normal   Affect:  increased in range   Thought Process:  goal directed and logical   Associations: intact associations   Thought Content:  normal   Perceptual Disturbances: None   Risk Potential: Suicidal Ideations none  Homicidal Ideations none  Potential for Aggression No   Sensorium:  person, place, time/date, and situation   Memory:  recent and remote memory grossly intact   Consciousness:  alert and awake    Attention: attention span and concentration were age appropriate   Insight:  age appropriate   Judgment: poor   Gait/Station: normal gait/station   Motor Activity: no abnormal movements     Medications: all current active meds have been reviewed.      Lab Results: I have reviewed the following results: LFTs: No new results in last 24 hours.   Liver Enzymes:   Lab Results   Component Value Date    AST 27 09/12/2024    ALT 30 09/12/2024    ALKPHOS 43 09/12/2024    TP 7.4 09/12/2024    ALB 4.4 09/12/2024    TBILI 0.79 09/12/2024       Administrative Statements   I have spent a total time of 30 minutes in caring for this patient on the day of the visit/encounter including Prognosis, Risks and benefits of tx options, Counseling / Coordination of care, Documenting in the medical record, Reviewing / ordering tests, medicine, procedures  , Obtaining or reviewing history  , and Communicating with other healthcare professionals .

## 2024-09-14 NOTE — ASSESSMENT & PLAN NOTE
Patient was previosly given information about Depakote,was ought to be initiated Depakote ER 1000 mg PO QD after dinner yesterday night for mood control, impulsivity, and irritability preceding admission and had was planned to obtain Depakote level 9/16/2024 at 5 PM. Patient refused yesterday nights Depakote.

## 2024-09-14 NOTE — NURSING NOTE
"Pt refused scheduled Depakote. Pt identified reason for refusal as concerns of side effects and \"not walking around like a zombie\" Pt also reported family history of liver disease. Pt states, \"ill take anything else but I need to talk to the Dr\" Despite education provided by RN pt adamantly refused. Safety precautions maintained. Will continue to monitor and assess.   "

## 2024-09-14 NOTE — PROGRESS NOTES
Progress Note - Bob Christine 24 y.o. male MRN: [id number]    Unit/Bed#: [facility] 218-02 Encounter: [id number]        Subjective:   Patient seen and examined at bedside after reviewing the chart and discussing the case with the caring staff.      Patient examined at bedside.  Patient reports continued cough and congestion.  Patient was diagnosed with URI at urgent care on 9/6.  Reports he was given an antibiotic but only took about 3 days of it.   Unable to find in chart review.      Vit D, B12, and folic acid normal.     Physical Exam   Vitals: Blood pressure 125/80, pulse 67, temperature 99.2 °F (37.3 °C), temperature source Temporal, resp. rate 17, height 6' (1.829 m), weight 106 kg (234 lb 6.4 oz), SpO2 95%.,Body mass index is 31.79 kg/m².  Constitutional: Patient in no acute distress.  HEENT: PERR, EOMI, MMM.  Cardiovascular: Normal rate and regular rhythm.    Pulmonary/Chest: Effort normal and breath sounds normal.   Abdomen: Soft, + BS, NT.    Assessment/Plan:  Bob Christine is a(n) 24 y.o. male with MDD.     Hx Kienbock's disease/R wrist pain.  Recent hx of R arm/wrist surgery.  Continue wearing R wrist brace.  Ibuprofen/tylenol for pain.  OT consulted.   Acute sinusitis/cough.  Start Augmentin 875-125 mg twice daily x 5 days (9/13) along with Florestor twice daily.  Mucinex at bedtime.   Arthralgias/HA.  Ibuprofen as needed.   Insomnia.  Patient on melatonin 3 mg daily at bedtime.

## 2024-09-15 PROCEDURE — 99232 SBSQ HOSP IP/OBS MODERATE 35: CPT | Performed by: STUDENT IN AN ORGANIZED HEALTH CARE EDUCATION/TRAINING PROGRAM

## 2024-09-15 RX ORDER — RISPERIDONE 1 MG/1
1 TABLET, ORALLY DISINTEGRATING ORAL
Status: DISCONTINUED | OUTPATIENT
Start: 2024-09-15 | End: 2024-09-16

## 2024-09-15 RX ADMIN — IBUPROFEN 800 MG: 800 TABLET, FILM COATED ORAL at 17:58

## 2024-09-15 RX ADMIN — GUAIFENESIN 600 MG: 600 TABLET ORAL at 20:31

## 2024-09-15 RX ADMIN — Medication 250 MG: at 10:00

## 2024-09-15 RX ADMIN — TRAZODONE HYDROCHLORIDE 50 MG: 50 TABLET ORAL at 22:00

## 2024-09-15 RX ADMIN — AMOXICILLIN AND CLAVULANATE POTASSIUM 1 TABLET: 875; 125 TABLET, FILM COATED ORAL at 20:31

## 2024-09-15 RX ADMIN — Medication 3 MG: at 20:32

## 2024-09-15 RX ADMIN — LORAZEPAM 0.5 MG: 0.5 TABLET ORAL at 20:32

## 2024-09-15 RX ADMIN — Medication 250 MG: at 18:09

## 2024-09-15 RX ADMIN — AMOXICILLIN AND CLAVULANATE POTASSIUM 1 TABLET: 875; 125 TABLET, FILM COATED ORAL at 10:00

## 2024-09-15 NOTE — NURSING NOTE
Pt approached nursing desk for evening medications. While waiting at window patient observed moving/dancing while waiting. Pt slipped and fell to floor. Witnessed by staff. Pt did not hit head. Did not lose consciousness. VS normal. Pt did hit right arm on ground. Complaints of 8/10 pain. Administered ibuprofen 800 mg for severe pain on numbered scale. Provided ice pack. VS normal. Pt currently observed in dining room siting at table socializing and laughing with peers. Medical provider aware. No new orders at this time. Fall precautions initiated as appropriate. Safety precautions maintained. Will continue to monitor.

## 2024-09-15 NOTE — NURSING NOTE
"Patient observed in milieu for most of the evening, amongst peers. His speech remains pressured at times, tangential at times. Labile in mood, has periods of anxiety and sadness related to his girlfriend but states that he has not had suicidal thoughts since admission. Patient reports a hx of headbanging but states he has not done this \"in a while\". He was compliant with evening medication. He is somewhat childlike and restless. Requires emotional reassurance often. Compliant with medication as ordered. Q7 minute monitoring ongoing.  "

## 2024-09-15 NOTE — NURSING NOTE
Pt visible on unit. Social select peers. Appears anxious and restless at times but denies all psychiatric symptoms.  Mood labile. Slight irritable edge. New orders to discontinue Depakote since patient has been refusing it. Will start Risperdal 1 mg at HS.

## 2024-09-15 NOTE — ASSESSMENT & PLAN NOTE
Patient was previosly scheduled to start Depakote, but had refused it.  Patient will be starting on Risperdal 1 mg at night.

## 2024-09-15 NOTE — PROGRESS NOTES
Progress Note - Behavioral Health   Name: Bob Christine 24 y.o. male I MRN: [id number]  Unit/Bed#: -02 I Date of Admission: 9/12/2024   Date of Service: 9/15/2024 I Hospital Day: 3    Assessment & Plan  Suicidal behavior with attempted self-injury (HCC)  Maintaining safety with no threats or gestures of self-harm.  Autism    Bipolar disorder, current episode mixed, severe, without psychotic features (HCC)  Patient was previosly scheduled to start Depakote, but had refused it.  Patient will be starting on Risperdal 1 mg at night.    Progress Toward Goals: Patient benefiting from the coping skills of group therapy.  He mentions he is looking forward to and is awaiting for the outpatient therapy, hoping it was benefit him.      Recommended Treatment: Continue with group therapy, milieu therapy and occupational therapy.      Risks, benefits and possible side effects of Medications:   Risks, benefits, and possible side effects of medications explained to patient and patient verbalizes understanding.      History of Present Illness   Behavior over the last 24 hours:  improved  Sleep: He states that although the trazodone helps with his sleep, he might have had a tolerance to its and it is taking longer for him to sleep.  Appetite: normal  Medication side effects: No  ROS: Right wrist pain post surgery    Subjective: Patient seen in the office, calm, positive towards his mental issues and is feeling ready for discharge. He mentions that he was socially active here and attending groups, finding them beneficial, wanting to help other peers and psychiatric needs when he discharges.  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 He mentions missing his girlfriend.      Objective   Mental Status Evaluation:  Appearance:  age appropriate and casually dressed   Behavior:  normal   Speech:  normal pitch and normal volume   Mood:  normal   Affect:   increased in range   Thought Process:  goal directed and logical   Associations: intact associations   Thought Content:  normal   Perceptual Disturbances: None   Risk Potential: Suicidal Ideations none  Homicidal Ideations none  Potential for Aggression No   Sensorium:  person, place, time/date, and situation   Memory:  recent and remote memory grossly intact   Consciousness:  alert and awake    Attention: attention span and concentration were age appropriate   Insight:  age appropriate   Judgment: poor   Gait/Station: normal gait/station   Motor Activity: no abnormal movements     Medications: all current active meds have been reviewed.      Lab Results: I have reviewed the following results: LFTs: No new results in last 24 hours.   Liver Enzymes:   Lab Results   Component Value Date    AST 27 09/12/2024    ALT 30 09/12/2024    ALKPHOS 43 09/12/2024    TP 7.4 09/12/2024    ALB 4.4 09/12/2024    TBILI 0.79 09/12/2024       Administrative Statements   I have spent a total time of 20 minutes in caring for this patient on the day of the visit/encounter including Prognosis, Risks and benefits of tx options, Counseling / Coordination of care, Documenting in the medical record, Reviewing / ordering tests, medicine, procedures  , Obtaining or reviewing history  , and Communicating with other healthcare professionals .

## 2024-09-15 NOTE — PROGRESS NOTES
Progress Note - Bob Christine 24 y.o. male MRN: [id number]    Unit/Bed#: [facility] 218-02 Encounter: [id number]        Subjective:   Patient seen and examined at bedside after reviewing the chart and discussing the case with the caring staff.      Patient examined at bedside.  Patient reports no acute symptoms.    Physical Exam   Vitals: Blood pressure 151/79, pulse 65, temperature 98 °F (36.7 °C), temperature source Temporal, resp. rate 16, height 6' (1.829 m), weight 107 kg (235 lb 12.8 oz), SpO2 98%.,Body mass index is 31.98 kg/m².  Constitutional: Patient in no acute distress.  HEENT: PERR, EOMI, MMM.  Cardiovascular: Normal rate and regular rhythm.    Pulmonary/Chest: Effort normal and breath sounds normal.   Abdomen: Soft, + BS, NT.    Assessment/Plan:  Bob Christine is a(n) 24 y.o. male with MDD.     Hx Kienbock's disease/R wrist pain.  Recent hx of R arm/wrist surgery.  Continue wearing R wrist brace.  Ibuprofen/tylenol for pain.  OT consulted.   Acute sinusitis/cough.  Start Augmentin 875-125 mg twice daily x 5 days (9/13) along with Florestor twice daily.  Mucinex at bedtime.   Arthralgias/HA.  Ibuprofen as needed.   Insomnia.  Patient on melatonin 3 mg daily at bedtime.

## 2024-09-15 NOTE — NURSING NOTE
B- bowel sounds present, soft, nontender, last bm: 9/15/2024    L- lungs clear b/l, non productive cough    E- no edema    P- pulses +2 b/l    S- Pimple on R and L feet, previous scar

## 2024-09-16 PROCEDURE — 99232 SBSQ HOSP IP/OBS MODERATE 35: CPT | Performed by: NURSE PRACTITIONER

## 2024-09-16 RX ORDER — QUETIAPINE FUMARATE 50 MG/1
50 TABLET, FILM COATED ORAL
Status: DISCONTINUED | OUTPATIENT
Start: 2024-09-16 | End: 2024-09-19 | Stop reason: HOSPADM

## 2024-09-16 RX ORDER — LORAZEPAM 0.5 MG/1
0.5 TABLET ORAL EVERY 8 HOURS PRN
Status: DISCONTINUED | OUTPATIENT
Start: 2024-09-16 | End: 2024-09-19 | Stop reason: HOSPADM

## 2024-09-16 RX ADMIN — AMOXICILLIN AND CLAVULANATE POTASSIUM 1 TABLET: 875; 125 TABLET, FILM COATED ORAL at 08:26

## 2024-09-16 RX ADMIN — Medication 250 MG: at 18:16

## 2024-09-16 RX ADMIN — Medication 3 MG: at 22:15

## 2024-09-16 RX ADMIN — AMOXICILLIN AND CLAVULANATE POTASSIUM 1 TABLET: 875; 125 TABLET, FILM COATED ORAL at 22:15

## 2024-09-16 RX ADMIN — QUETIAPINE FUMARATE 50 MG: 50 TABLET ORAL at 22:15

## 2024-09-16 RX ADMIN — Medication 250 MG: at 08:26

## 2024-09-16 RX ADMIN — TRAZODONE HYDROCHLORIDE 50 MG: 50 TABLET ORAL at 23:01

## 2024-09-16 RX ADMIN — GUAIFENESIN 600 MG: 600 TABLET ORAL at 22:15

## 2024-09-16 NOTE — PROGRESS NOTES
Progress Note - Behavioral Health   Name: Bob Christine 24 y.o. male I MRN: [id number]  Unit/Bed#: -02 I Date of Admission: 9/12/2024   Date of Service: 9/16/2024 I Hospital Day: 4    Assessment & Plan  Suicidal behavior with attempted self-injury (HCC)  Maintaining safety with no threats or gestures of self-harm.  Autism    Bipolar disorder, current episode mixed, severe, without psychotic features (HCC)  Patient was previosly scheduled to start Depakote, but had refused it.  Patient started on Risperdal 1 mg at nigh over weekend but continues to refuse. Agreeable to discontinue Risperdal 1mg and start Seroquel 50mg PO QHS for intermittent anxiety, mood control, and racing thoughts. Antihistaminic effects of Seroquel will also assist with sleep.   Signed 72-hour notice 9/16/2024 at 1248    Progress Toward Goals: Improving.  Maintaining safety and mood control with no agitated or aggressive outburst toward self or others.  Appears to be exhibiting improving insight and judgment regarding marijuana use contributing to psychiatric decompensation.  Thoughts are organized and forward thinking.  Refused Risperdal and Depakote; provided education regarding Seroquel and agreeable to initiate for mood control, intermittent anxiety, and occasional racing thoughts.  Patient signed 72-hour notice 9/16/2024 at 1248.    Recommended Treatment: Continue with group therapy, milieu therapy and occupational therapy.      Risks, benefits and possible side effects of Medications:   Geo has limited understanding of risk versus benefits of medications, but agrees to take as prescribed.    History of Present Illness   Behavior over the last 24 hours: Some improvement  Sleep: normal, PRN trazodone effective  Appetite: normal  Medication side effects: No  ROS: no complaints and all other systems are negative    Subjective: Bob has been maintaining safety with no return of SI or thoughts of SIB.  Visible, social, and  "participatory milieu activities.  Mood is controlled with no outbursts.  Tending to ADLs independently and identifies adequate sleep and appetite.  States he takes trazodone at night \"because I am sleeping in a strange place.\"  He spoke about his plan to focus on himself and continue trying to live a healthy lifestyle which includes meditating, working out, and staying away from marijuana and other illicit substances.  He spoke briefly about his relationship and stated if things did not work out with his girlfriend, \"it is not the end of the world.  I can love her and not be with her.\"  No delusional content verbalized.  Endorses occasional racing thoughts and anxiety due to unit stimuli.  Describes his family as strong protective factor against suicide and supports.  Receptive to education regarding Seroquel which could be helpful with maintaining mood control, anxiety, and occasional racing thoughts on outpatient basis.  Verbalizes desire for therapy upon discharge.    Objective   Mental Status Evaluation:  Appearance:  casually dressed, overweight, and long blonde hair   Behavior:  Cooperative, good eye contact   Speech:  normal pitch and normal volume   Mood:  \"Very good\"   Affect:  Euthymic, less anxious   Thought Process:  goal directed   Associations: circumstantial associations   Thought Content:  No overt delusions or paranoia verbalized   Perceptual Disturbances: Denied AVH, did not appear internally preoccupied   Risk Potential: Suicidal Ideations none  Homicidal Ideations none  Potential for Aggression No   Sensorium:  person, place, time/date, and situation   Memory:  recent and remote memory grossly intact   Consciousness:  alert and awake    Attention: attention span and concentration were age appropriate   Insight:  limited, improving   Judgment: limited, improving   Gait/Station: normal gait/station and normal balance   Motor Activity: no abnormal movements     Medications: all current active meds " have been reviewed, continue current psychiatric medications, and planned medication changes: Discontinue Risperdal 1 mg PO QHS. Start Seroquel 50mg PO QHS .      Lab Results: I have reviewed the following results: Drug Screen:   Results from last 7 days   Lab Units 09/11/24  1453   BARBITURATE UR  Negative   BENZODIAZEPINE UR  Negative   THC UR  Positive*   COCAINE UR  Negative   METHADONE URINE  Negative   OPIATE UR  Negative   PCP UR  Negative     CMP:   Lab Results   Component Value Date    SODIUM 139 09/12/2024    K 4.1 09/12/2024     09/12/2024    CO2 31 09/12/2024    AGAP 6 09/12/2024    BUN 17 09/12/2024    CREATININE 0.99 09/12/2024    GLUC 86 09/12/2024    CALCIUM 9.7 09/12/2024    AST 27 09/12/2024    ALT 30 09/12/2024    ALKPHOS 43 09/12/2024    TP 7.4 09/12/2024    ALB 4.4 09/12/2024    TBILI 0.79 09/12/2024    EGFR 104 09/12/2024       Administrative Statements   I have spent a total time of 30 minutes in caring for this patient on the day of the visit/encounter including  medication education, treatment plan, safety planning, supportive therapy .

## 2024-09-16 NOTE — PROGRESS NOTES
09/16/24    Team Meeting   Meeting Type Daily Rounds   Team Members Present   Team Members Present Physician;Nurse;;Occupational Therapist   Physician Team Member Yuniel LAKE, Daniel LAKE, Deepti BUSCH   Nursing Team Member Margret RN   Social Work Team Member Gustavo PUENTE   OT Team Member Pope MEDINA   Patient/Family Present   Patient Present No   Patient's Family Present No     201, autistic, hyper, childlike, refused Risperdal, doesn't want scheduled meds, took prn ativan, manic, having issues with peers, DC focused, good insight, switched meds over the weekend, remove ativan, sleeping, ADLs good, bright

## 2024-09-16 NOTE — NURSING NOTE
Patient observed socializing amongst peers throughout the evening. During assessment he appears anxious, restless, and he was very defensive about refusing Risperidone. Patient reports that he has been coping quite well despite acuity of the unit and that should prove that he does not need psychiatric medications, but rather that he should focus on his sobriety. Patient administered 0.5 mg ativan for frey score of 26 at 2032. He reports that he feels calmer, however seems just as restless as he was. Nonetheless expresses gratitude. Denies SI/HI and hallucinations. Q7 minute checks ongoing.

## 2024-09-16 NOTE — PLAN OF CARE
Problem: SAFETY ADULT  Goal: Patient will remain free of falls  Description: INTERVENTIONS:  - Educate patient/family on patient safety including physical limitations  - Instruct patient to call for assistance with activity   - Keep Call bell within reach  - Keep care items and personal belongings within reach  - Initiate and maintain comfort round  - Apply yellow socks and bracelet for high fall risk patients  - Consider moving patient to room near nurses station  Outcome: Not Progressing

## 2024-09-16 NOTE — PROGRESS NOTES
Progress Note - Bob Christine 24 y.o. male MRN: [id number]    Unit/Bed#: [facility] 218-02 Encounter: [id number]        Subjective:   Patient seen and examined at bedside after reviewing the chart and discussing the case with the caring staff.      Patient examined at bedside.  Patient reports no acute symptoms. Is wondering when PT/OT will see him.     Physical Exam   Vitals: Blood pressure 142/73, pulse 78, temperature 97.8 °F (36.6 °C), temperature source Temporal, resp. rate 18, height 6' (1.829 m), weight 107 kg (235 lb 12.8 oz), SpO2 97%.,Body mass index is 31.98 kg/m².  Constitutional: Patient in no acute distress.  HEENT: PERR, EOMI, MMM.  Cardiovascular: Normal rate and regular rhythm.    Pulmonary/Chest: Effort normal and breath sounds normal.   Abdomen: Soft, + BS, NT.    Assessment/Plan:  Bob Christine is a(n) 24 y.o. male with MDD.     Hx Kienbock's disease/R wrist pain.  Recent hx of R arm/wrist surgery.  Continue wearing R wrist brace.  Ibuprofen/tylenol for pain.  PT/OT consulted.   Acute sinusitis/cough.  Start Augmentin 875-125 mg twice daily x 5 days (9/13) along with Florestor twice daily.  Mucinex at bedtime.   Arthralgias/HA.  Ibuprofen as needed.   Insomnia.  Patient on melatonin 3 mg daily at bedtime.    The patient was discussed with Dr. Sood and he is in agreement with the above note.

## 2024-09-16 NOTE — NURSING NOTE
Patient compliant with meals, visible on unit continues on Augmentin as ordered. Pleasant calm cooperative social with peers attends group denies SI HI AVH at this time safety checks maintained.

## 2024-09-16 NOTE — PLAN OF CARE
Problem: Ineffective Coping  Goal: Participates in unit activities  Description: Interventions:  - Provide therapeutic environment   - Provide required programming   - Redirect inappropriate behaviors   Outcome: Progressing     Problem: Risk for Self Injury/Neglect  Goal: Attend and participate in unit activities, including therapeutic, recreational, and educational groups  Description: Interventions:  - Provide therapeutic and educational activities daily, encourage attendance and participation, and document same in the medical record  - Obtain collateral information, encourage visitation and family involvement in care   Outcome: Progressing   He attends groups

## 2024-09-16 NOTE — ASSESSMENT & PLAN NOTE
Patient was previosly scheduled to start Depakote, but had refused it.  Patient started on Risperdal 1 mg at nigh over weekend but continues to refuse. Agreeable to discontinue Risperdal 1mg and start Seroquel 50mg PO QHS for intermittent anxiety, mood control, and racing thoughts. Antihistaminic effects of Seroquel will also assist with sleep.   Signed 72-hour notice 9/16/2024 at 1248

## 2024-09-17 PROCEDURE — 99232 SBSQ HOSP IP/OBS MODERATE 35: CPT | Performed by: PSYCHIATRY & NEUROLOGY

## 2024-09-17 RX ADMIN — Medication 250 MG: at 18:35

## 2024-09-17 RX ADMIN — Medication 250 MG: at 08:45

## 2024-09-17 RX ADMIN — AMOXICILLIN AND CLAVULANATE POTASSIUM 1 TABLET: 875; 125 TABLET, FILM COATED ORAL at 08:45

## 2024-09-17 RX ADMIN — TRAZODONE HYDROCHLORIDE 50 MG: 50 TABLET ORAL at 23:16

## 2024-09-17 NOTE — NURSING NOTE
Patient observed within the milieu amongst peers for most of the shift. He continues to be extremely hyperverbal and childlike although this evening he is agreeable to starting psychotropic medication for mood stabilization. He states his body is extremely sensitive to substances but understands he is starting at a low dose in a controlled setting. He continues to deny suicidal or violent thoughts. Insight seems to have improved. Anxiety he reports fluctuates due to unit stimulation but he has been socializing with peers appropriately. Makes needs known. No irritability noted tonight. Q7 minute checks ongoing.

## 2024-09-17 NOTE — PLAN OF CARE
Problem: Anxiety  Goal: Anxiety is at manageable level  Description: Interventions:  - Assess and monitor patient's anxiety level.   - Monitor for signs and symptoms (heart palpitations, chest pain, shortness of breath, headaches, nausea, feeling jumpy, restlessness, irritable, apprehensive).   - Collaborate with interdisciplinary team and initiate plan and interventions as ordered.  - [place] patient to unit/surroundings  - Explain treatment plan  - Encourage participation in care  - Encourage verbalization of concerns/fears  - Identify coping mechanisms  - Assist in developing anxiety-reducing skills  - Administer/offer alternative therapies  - Limit or eliminate stimulants  Outcome: Progressing

## 2024-09-17 NOTE — PROGRESS NOTES
09/17/24    Team Meeting   Meeting Type Daily Rounds   Team Members Present   Team Members Present Physician;Nurse;;Occupational Therapist   Physician Team Member Daniel Brice DO, MD, Deepti BUSCH   Nursing Team Member Margret DEVI   Social Work Team Member Gustavo PUENTE   OT Team Member Pope MEDINA   Patient/Family Present   Patient Present No   Patient's Family Present No     201, childlike, low frustration tolerance, signed 72 yesterday at 17:58, observe for safety, DC safety, mom can pick him up

## 2024-09-17 NOTE — CASE MANAGEMENT
CM placed call to patient's medical provider, [facility] for aftercare f/u. Spoke with Haley who scheduled the [facility] appt. For Tuesday, 10/1/24 at 5:30 PM.    BARBARA left a message for Haley/ phone number above/ requesting a return call to schedule a [facility] f/u  appt with Dr. Albert, the patient's established psychiatric provider.

## 2024-09-17 NOTE — PROGRESS NOTES
Progress Note - Bob Christine 24 y.o. male MRN: [id number]    Unit/Bed#: [facility] 218-02 Encounter: [id number]        Subjective:   Patient seen and examined at bedside after reviewing the chart and discussing the case with the caring staff.      Patient examined at bedside.  Patient reports no acute symptoms.     Physical Exam   Vitals: Blood pressure 134/70, pulse 80, temperature 98.5 °F (36.9 °C), temperature source Temporal, resp. rate 17, height 6' (1.829 m), weight 107 kg (235 lb 12.8 oz), SpO2 96%.,Body mass index is 31.98 kg/m².  Constitutional: Patient in no acute distress.  HEENT: PERR, EOMI, MMM.  Cardiovascular: Normal rate and regular rhythm.    Pulmonary/Chest: Effort normal and breath sounds normal.   Abdomen: Soft, + BS, NT.    Assessment/Plan:  Bob Christine is a(n) 24 y.o. male with MDD.     Hx Kienbock's disease/R wrist pain.  Recent hx of R arm/wrist surgery.  Continue wearing R wrist brace.  Ibuprofen/tylenol for pain.  PT/OT consulted.   Acute sinusitis/cough.  Start Augmentin 875-125 mg twice daily x 5 days (9/13) along with Florestor twice daily.  Mucinex at bedtime.   Arthralgias/HA.  Ibuprofen as needed.   Insomnia.  Patient on melatonin 3 mg daily at bedtime.

## 2024-09-17 NOTE — NURSING NOTE
Patient med and meal compliant visible on unit social with peers irritable with staff denies all this time Safety checks maintained.

## 2024-09-17 NOTE — PROGRESS NOTES
"Progress Note - Behavioral Health   Name: Bob Christine 24 y.o. male I MRN: [id number]  Unit/Bed#: -02 I Date of Admission: 9/12/2024   Date of Service: 9/17/2024 I Hospital Day: 5    Assessment & Plan  Suicidal behavior with attempted self-injury (HCC)  Maintaining safety with no threats or gestures of self-harm.  Autism    Bipolar disorder, current episode mixed, severe, without psychotic features (HCC)  Patient was previosly scheduled to start Depakote, but had refused it.  Patient started on Risperdal 1 mg at nigh over weekend but continues to refuse. Agreeable to discontinue Risperdal 1mg and start Seroquel 50mg PO QHS for intermittent anxiety, mood control, and racing thoughts. Antihistaminic effects of Seroquel will also assist with sleep.   Signed 72-hour notice 9/16/2024 at 1248    Progress Toward Goals: Maintaining safety with no return of SIB/SI.  Visible and social in the milieu.  Exhibits organized and forward thought process.  Identifies adequate safety plans and protective factors against suicide.  Tolerating initiation of Seroquel well and denies any side effects.  Signed 72-hour notice 9/16/2024 at 1248.  Plan is to continue to assess for safety throughout duration of 72-hour notice with anticipated discharge 9/19/2024 should patient present with no criteria to pursue involuntary admission.    Recommended Treatment: Continue with group therapy, milieu therapy and occupational therapy.      Risks, benefits and possible side effects of Medications:   Bob has limited understanding of risk versus benefits of medications, but agrees to take as prescribed.    History of Present Illness   Behavior over the last 24 hours:  unchanged  Sleep: normal  Appetite: normal  Medication side effects: No  ROS: no complaints and all other systems are negative    Subjective: Bob is visible, social, and engaged in milieu activities.  Describes his mood as \"pretty good\" and denies depression.  " PAT PREOP PHONE INTERVIEW COMPLETED WITH:     PATIENT ADVISED NOT TO EAT/DRINK ANYTHING PAST MIDNIGHT EVENING PRIOR TO SURGERY,  NOTHING TO EAT/DRINK MORNING OF SURGERY UNLESS SIP OF WATER TO SWALLOW MEDICATION;  LEAVE ALL VALUABLES AT HOME; DO BRING PICTURE ID, INSURANCE CARD AND ANY COPAY; WEAR COMFORTABLE CLOTHING;  NO PERFUMES, POWDERS, LOTIONS; NO ALCOHOL 24 HOURS BEFORE OR AFTER SURGERY;  WILL NEED TO BE DRIVEN HOME BY FAMILY OR FRIEND;  AVOID TAKING NSAIDS, ASPIRIN, FISH OIL, VITAMIN E OR GLUCOSAMINE/CHONDROITIN DURING THIS TIME PRIOR TO SURGERY;  MAY TAKE TYLENOL.  INSTRUCTED TO REPORT TO [facility] ADMITTING DEPARTMENT AT THE TIME GIVEN BY SURGEON'S OFFICE.  INSTRUCTION PROVIDED FOR CHG SOAP.   "Endorses mild anxiety but utilizing coping skills effectively.  Reports decrease in racing thoughts with initiation of Seroquel last evening.  Receptive to medication education.  Maintaining safety with no return of SI/SIB.  Discusses his plan to maintain sobriety and continue self-care upon discharge.  Identified adequate safety plan and protective factors against suicide.    Objective   Mental Status Evaluation:  Appearance:  age appropriate, overweight, and long blonde hair, casually dressed   Behavior:  Cooperative, social, overly bright at times   Speech:  normal pitch and normal volume   Mood:  \"Pretty good\"   Affect:  mood-congruent and euthymic   Thought Process:  goal directed and forward thinking   Associations: circumstantial associations   Thought Content:  No overt delusions or paranoia verbalized, reports decrease in racing thoughts   Perceptual Disturbances: Denied AVH, did not appear internally preoccupied   Risk Potential: Suicidal Ideations none  Homicidal Ideations none  Potential for Aggression No   Sensorium:  person, place, time/date, and situation   Memory:  recent and remote memory grossly intact   Consciousness:  alert and awake    Attention: attention span and concentration were age appropriate   Insight:  limited   Judgment: limited   Gait/Station: normal gait/station and normal balance   Motor Activity: no abnormal movements     Medications: all current active meds have been reviewed and continue current psychiatric medications.      Lab Results: I have reviewed the following results: Drug Screen:   Results from last 7 days   Lab Units 09/11/24  1453   BARBITURATE UR  Negative   BENZODIAZEPINE UR  Negative   THC UR  Positive*   COCAINE UR  Negative   METHADONE URINE  Negative   OPIATE UR  Negative   PCP UR  Negative     CMP:   Lab Results   Component Value Date    SODIUM 139 09/12/2024    K 4.1 09/12/2024     09/12/2024    CO2 31 09/12/2024    AGAP 6 09/12/2024    BUN 17 09/12/2024 "    CREATININE 0.99 09/12/2024    GLUC 86 09/12/2024    CALCIUM 9.7 09/12/2024    AST 27 09/12/2024    ALT 30 09/12/2024    ALKPHOS 43 09/12/2024    TP 7.4 09/12/2024    ALB 4.4 09/12/2024    TBILI 0.79 09/12/2024    EGFR 104 09/12/2024       Administrative Statements   I have spent a total time of 30 minutes in caring for this patient on the day of the visit/encounter including medication education, treatment plan, safety planning.

## 2024-09-17 NOTE — PROGRESS NOTES
Met with Bob completed his relapse prevention. He was able to identify his protective factors, warning signs, stressors, coping skills and support people. We reviewed the community supports, he signed his 72 hour notice and will be discharged on Thursday.

## 2024-09-18 PROCEDURE — 97161 PT EVAL LOW COMPLEX 20 MIN: CPT

## 2024-09-18 PROCEDURE — 97165 OT EVAL LOW COMPLEX 30 MIN: CPT

## 2024-09-18 PROCEDURE — 99232 SBSQ HOSP IP/OBS MODERATE 35: CPT | Performed by: NURSE PRACTITIONER

## 2024-09-18 RX ADMIN — Medication 250 MG: at 08:50

## 2024-09-18 RX ADMIN — TRAZODONE HYDROCHLORIDE 50 MG: 50 TABLET ORAL at 23:09

## 2024-09-18 RX ADMIN — GUAIFENESIN 600 MG: 600 TABLET ORAL at 20:54

## 2024-09-18 RX ADMIN — Medication 1000 UNITS: at 18:53

## 2024-09-18 RX ADMIN — Medication 250 MG: at 18:53

## 2024-09-18 RX ADMIN — Medication 3 MG: at 20:54

## 2024-09-18 NOTE — NURSING NOTE
"Patient noted to be visible and social w/ peers on the milieu. Denied SI/HI/AVH, depression, and anxiety. Refused scheduled hs medications. Patient stated \"I only Trazodone for sleep I don't need any of the other stuff.\" Utilized prn Trazodone 50 mg @2316. Q 7 min safety checks continuous and maintained.   "

## 2024-09-18 NOTE — NURSING NOTE
Patient appears to have slept majority of the night without interruption w/ a late sleep time of 0050, non labored breathing noted while asleep. Q 7 min safety checks maintained.

## 2024-09-18 NOTE — SOCIAL WORK
DIANE spoke with mother ([phone]). Can  pt at 5:00p tomorrow. Reports pt sounds better, has no safety concerns at this time.

## 2024-09-18 NOTE — PLAN OF CARE
Problem: Ineffective Coping  Goal: Participates in unit activities  Description: Interventions:  - Provide therapeutic environment   - Provide required programming   - Redirect inappropriate behaviors   Outcome: Progressing     Problem: Risk for Self Injury/Neglect  Goal: Attend and participate in unit activities, including therapeutic, recreational, and educational groups  Description: Interventions:  - Provide therapeutic and educational activities daily, encourage attendance and participation, and document same in the medical record  - Obtain collateral information, encourage visitation and family involvement in care   Outcome: Progressing   Patient is active in group therapy.

## 2024-09-18 NOTE — PROGRESS NOTES
Progress Note - Bob Christine 24 y.o. male MRN: [id number]    Unit/Bed#: [facility] 218-02 Encounter: [id number]        Subjective:   Patient seen and examined at bedside after reviewing the chart and discussing the case with the caring staff.      Patient examined at bedside.  Patient reports no acute symptoms.  He is requesting Vitamin D as he takes this at home.     Physical Exam   Vitals: Blood pressure 145/81, pulse 98, temperature 98.9 °F (37.2 °C), temperature source Temporal, resp. rate 19, height 6' (1.829 m), weight 107 kg (235 lb 12.8 oz), SpO2 99%.,Body mass index is 31.98 kg/m².  Constitutional: Patient in no acute distress.  HEENT: PERR, EOMI, MMM.  Cardiovascular: Normal rate and regular rhythm.    Pulmonary/Chest: Effort normal and breath sounds normal.   Abdomen: Soft, + BS, NT.    Assessment/Plan:  Bob Christine is a(n) 24 y.o. male with MDD.     Hx Kienbock's disease/R wrist pain.  Recent hx of R arm/wrist surgery.  Continue wearing R wrist brace.  Ibuprofen/tylenol for pain.  PT/OT consulted.   Acute sinusitis/cough.  Start Augmentin 875-125 mg twice daily x 5 days (9/13) along with Florestor twice daily.  Mucinex at bedtime.   Arthralgias/HA.  Ibuprofen as needed.   Insomnia.  Patient on melatonin 3 mg daily at bedtime.  Vit D insufficiency.  Levels normal.  Restart daily supplement per patient request.     The patient was discussed with Dr. Sood and he is in agreement with the above note.

## 2024-09-18 NOTE — OCCUPATIONAL THERAPY NOTE
"  Occupational Therapy Evaluation     Patient Name: Bob Christine  Today's Date: 9/18/2024  Problem List  Principal Problem:    Bipolar disorder, current episode mixed, severe, without psychotic features (HCC)  Active Problems:    Autism    Suicidal behavior with attempted self-injury (HCC)    Past Medical History  Past Medical History:   Diagnosis Date    ADHD     Anxiety      Past Surgical History  No past surgical history on file.        09/18/24 0810   OT Last Visit   OT Visit Date 09/18/24   Note Type   Note type Evaluation   Pain Assessment   Pain Assessment Tool 0-10   Pain Score 5   Pain Location/Orientation Orientation: Right  (wrist)   Pain Radiating Towards n/a   Pain Onset/Description Onset: Ongoing;Frequency: Intermittent   Effect of Pain on Daily Activities n/a   Patient's Stated Pain Goal No pain   Hospital Pain Intervention(s) Emotional support;Environmental changes  (donned wrist brace)   Multiple Pain Sites No   Restrictions/Precautions   Weight Bearing Precautions Per Order No   Braces or Orthoses   (R wrist brace)   Other Precautions Pain   Home Living   Type of Home House   Home Layout Multi-level  (pt lives on the 3rd floor)   Home Equipment   (no AD used at baseline)   Prior Function   Level of [place] Independent with ADLs;Independent with functional mobility   Lives With   (Mother)   Receives Help From   (girlfriend)   Falls in the last 6 months 1 to 4  (x1)   Subjective   Subjective \"I had surgery back in May for my wrist\"   ADL   Where Assessed Edge of bed   Eating Assistance 7  Independent   Grooming Assistance 7  Independent   UB Bathing Assistance 7  Independent   LB Bathing Assistance 7  Independent   UB Dressing Assistance 7  Independent   LB Dressing Assistance 7  Independent   Toileting Assistance  7  Independent   Bed Mobility   Supine to Sit 7  Independent   Sit to Supine   (DNT; pt standing in room upon conclusion of session)   Additional Comments denied dizziness with " transitional movements   Transfers   Sit to Stand 7  Independent   Stand to Sit 7  Independent   Additional Comments no device used; good safety awareness observed   Functional Mobility   Functional Mobility 7  Independent   Additional Comments Pt completed long distance IADL mobility around hallway at independent level. No significant LOB observed, no instability   Balance   Static Sitting Normal   Dynamic Sitting Normal   Static Standing Good   Dynamic Standing Good   Ambulatory Good   Activity Tolerance   Activity Tolerance Patient tolerated treatment well   Nurse Made Aware Yes, nursing staff made aware of session outcomes   RUE Assessment   RUE Assessment WFL  (limited wrist flexion/extension d/t surgery)   RUE Strength   RUE Overall Strength   (4-/5)   LUE Assessment   LUE Assessment WFL   LUE Strength   LUE Overall Strength   (4-/5)   Hand Function   Gross Motor Coordination Functional   Fine Motor Coordination Functional   Hand Function Comments Pt reports L hand dominant   Sensation   Light Touch Partial deficits in the RUE   Cognition   Overall Cognitive Status WFL   Arousal/Participation Alert;Responsive;Cooperative   Attention Within functional limits   Orientation Level Oriented X4   Memory Within functional limits   Following Commands Follows all commands and directions without difficulty   Comments Pt agreeable to OT evaluation   Assessment   Limitation Decreased UE ROM   Prognosis Good   Assessment Pt is a 24 y.o. male seen for OT evaluation s/p admit to [facility] on 9/12/2024 w/ Bipolar disorder, current episode mixed, severe, without psychotic features (HCC).  Comorbidities affecting pt's functional performance at time of assessment include: bipolar disorder, autism, suicidal behavior, ADHD, increased frequency of urination, obesity. Personal factors affecting pt at time of IE include:decreased initiation and engagement  and health management . Prior to admission, pt was I with ADLs and IADLs.  Upon evaluation: pt is completing ADLs and functional mobility independently. Pt demonstrated independence with donning/doffing right wrist brace. Pt is currently functioning at/around baseline and does not warrant any further inpatient OT services at this time. From OT standpoint, recommendation at time of d/c would be home with minimal intensity OT resources for outpatient hand therapy.   Goals   Patient Goals to go home   Plan   OT Frequency Eval only   Discharge Recommendation   Rehab Resource Intensity Level, OT III (Minimum Resource Intensity)  (Pt would benefit from outpatient OT services for hand therapy)   Additional Comments  The patient's raw score on the AM-PAC Daily Activity inpatient short form is 24, standardized score is 57.54, greater than 39.4. Patients at this level are likely to benefit from discharge with minimal intensity OT resources. Please refer to the recommendation of the Occupational Therapist for safe discharge planning.   Additional Comments 2 co evaluation with PT Niurka   [facility] Daily Activity Inpatient   Lower Body Dressing 4   Bathing 4   Toileting 4   Upper Body Dressing 4   Grooming 4   Eating 4   Daily Activity Raw Score 24   Daily Activity Standardized Score (Calc for Raw Score >=11) 57.54   AM-PAC Applied Cognition Inpatient   Following a Speech/Presentation 4   Understanding Ordinary Conversation 4   Taking Medications 4   Remembering Where Things Are Placed or Put Away 4   Remembering List of 4-5 Errands 4   Taking Care of Complicated Tasks 4   Applied Cognition Raw Score 24   Applied Cognition Standardized Score 62.21     All needs met; OT will sign off at this time  Vanesa Cintron OTR/L

## 2024-09-18 NOTE — NURSING NOTE
Pt visible on unit and overtly social with peers. Pt is cooperative and med compliant. Pt appeared elated and speech was pressured at times but appropriate. Pt endorses mild anxiety and denies depression, SI/HI/AVH. Pt is looking forward to discharge and has a positive outlook for the future. Pt verbalized coping skills he plans on utilizing is self care, guided imagery, grounding, and meditation. Continuous monitoring maintained.

## 2024-09-18 NOTE — PHYSICAL THERAPY NOTE
PHYSICAL THERAPY EVALUATION  NAME:  Bob Christine  DATE: 09/18/24    AGE:   24 y.o.  Mrn:   [id number]  ADMIT DX:  Encounter for psychological evaluation [Z00.8]  Major depressive disorder [F32.9]  Problem List:   Patient Active Problem List   Diagnosis    Encounter for psychological evaluation    Autism    ADHD (attention deficit hyperactivity disorder)    Difficult or painful urination    Increased frequency of urination    Obesity    STD exposure    Symptoms of upper respiratory infection (URI)    Suicidal behavior with attempted self-injury (HCC)    Bipolar disorder, current episode mixed, severe, without psychotic features (HCC)       Past Medical History  Past Medical History:   Diagnosis Date    ADHD     Anxiety        Past Surgical History  No past surgical history on file.    Length Of Stay: 6  Performed at least 2 patient identifiers during session: Name and ID bracelet       09/18/24 0808   PT Last Visit   PT Visit Date 09/18/24   Note Type   Note type Evaluation   Additional Comments Pt had R wrist surgery in May 2024   Pain Assessment   Pain Assessment Tool 0-10   Pain Score 5   Pain Location/Orientation Orientation: Right  (wrist)   Hospital Pain Intervention(s)   (pt donned wrist brace)   Restrictions/Precautions   Weight Bearing Precautions Per Order No   Braces or Orthoses   (R wrist brace)   Other Precautions Pain   Home Living   Type of Home House   Home Layout Multi-level  (pt lives on the 3rd FL)   Home Equipment   (no AD used at baseline)   Prior Function   Level of [place] Independent with ADLs;Independent with functional mobility   Lives With   (Mother)   Receives Help From   (girlfriend)   Falls in the last 6 months 1 to 4   General   Family/Caregiver Present No   Cognition   Overall Cognitive Status WFL   Arousal/Participation Alert   Orientation Level Oriented X4   Memory Within functional limits   Following Commands Follows all commands and directions without difficulty    RLE Assessment   RLE Assessment WFL   LLE Assessment   LLE Assessment WFL   Coordination   Sensation X  (B hand sensation)   Bed Mobility   Supine to Sit 7  Independent   Additional Comments pt denied dizziness with transitional movement   Transfers   Sit to Stand 7  Independent   Stand to Sit 7  Independent   Additional Comments good safety awareness noted   Ambulation/Elevation   Gait pattern WNL   Gait Assistance 7  Independent   Assistive Device None   Distance 250 ft   Ambulation/Elevation Additional Comments no LOB or deficts noted   Balance   Static Sitting Normal   Dynamic Sitting Normal   Static Standing Good   Dynamic Standing Good   Ambulatory Good   Endurance Deficit   Endurance Deficit No   Activity Tolerance   Activity Tolerance Patient tolerated treatment well   Assessment   Prognosis Good   Assessment Pt is 24 y.o. male seen for PT evaluation s/p admit to [facility] on 9/12/2024 w/ Bipolar disorder, current episode mixed, severe, without psychotic features (HCC). PT consulted to assess pt's functional mobility and d/c needs. Order placed for PT eval and tx, w/ up and OOB as tolerated order. Pt agreeable to PT  session upon arrival, pt found supine in bed. The following objective measures performed on IE also reveal limitations: AM-PAC 6 Clicks 24/24.  Patient was independent w/ all functional mobility w/ no AD.  Pt presents at [facility] with transfers, ambulation, and bed mobility.  From PT/mobility standpoint, recommendation at time of d/c would be anticipate no needs/resources. Upon conclusion pt  standing in room . D/c PT services at this time. Complexity: Comorbidities affecting pt's physical performance at time of assessment include: anxiety and Kienbock's disease, autism and ADHD . Personal factors affecting pt at time of IE include: history of falls, lives in multistory home, and anxiety. Please find objective findings from PT assessment regarding body systems outlined above with  impairments and limitations including pain and altered sensation.  Pt's clinical presentation is currently evolving seen in pt's presentation of pain and fall during admission. The patient's AM-PAC Basic Mobility Inpatient Short Form Raw Score is 24. Please also refer to the recommendation of the Physical Therapist for safe discharge planning. Pt seen as a co-eval with OT due to the patient's co-morbidities and pain.   Barriers to Discharge None   Goals   Patient Goals to go home   Discharge Recommendation   Rehab Resource Intensity Level, PT III (Minimum Resource Intensity)   AM-PAC Basic Mobility Inpatient   Turning in Flat Bed Without Bedrails 4   Lying on Back to Sitting on Edge of Flat Bed Without Bedrails 4   Moving Bed to Chair 4   Standing Up From Chair Using Arms 4   Walk in Room 4   Climb 3-5 Stairs With Railing 4   Basic Mobility Inpatient Raw Score 24   Basic Mobility Standardized Score 57.68   [facility] Highest Level Of Mobility   JH-HLM Goal 8: Walk 250 feet or more   JH-HLM Achieved 8: Walk 250 feet ot more         Time In: 0800  Time Out: 0808  Total Evaluation Minutes: 8    Niurka Gunderson, PT

## 2024-09-18 NOTE — PROGRESS NOTES
Progress Note - Behavioral Health   Name: Bob Christine 24 y.o. male I MRN: [id number]  Unit/Bed#: -02 I Date of Admission: 9/12/2024   Date of Service: 9/18/2024 I Hospital Day: 6    Assessment & Plan  Suicidal behavior with attempted self-injury (HCC)  Maintaining safety with no threats or gestures of self-harm.  Autism    Bipolar disorder, current episode mixed, severe, without psychotic features (HCC)  Patient was previosly scheduled to start Depakote, but had refused it.  Patient started on Risperdal 1 mg at nigh over weekend but continues to refuse. Agreeable to discontinue Risperdal 1mg  Continue Seroquel 50mg PO QHS for intermittent anxiety, mood control, and racing thoughts. Antihistaminic effects of Seroquel will also assist with sleep.   Signed 72-hour notice 9/16/2024 at 1248    Progress Toward Goals: Unchanged.  Continues to maintain safety and mood control with no return of SI.  Thoughts are organized and forward thinking.  Identifies adequate safety plan and protective factors against suicide.  Refused Seroquel last evening; receptive to medication education.  Signed 72-hour notice and 9/16/2024 at 1248.  At this time, patient appears to be approaching his psychiatric baseline and presents with no criteria to pursue involuntary admission.  Will continue to assess safety over the remainder of 72-hour notice with anticipated discharge 9/19/2024.    Recommended Treatment: Continue with group therapy, milieu therapy and occupational therapy.      Risks, benefits and possible side effects of Medications:   Risks, benefits, and possible side effects of medications explained to patient and patient verbalizes understanding.      History of Present Illness   Behavior over the last 24 hours:  unchanged  Sleep: PRN trazodone effective  Appetite: normal  Medication side effects: No  ROS: no complaints and all other systems are negative    Subjective: Bob is maintaining safety and behavioral  "control with no threats or gestures SIB/SI.  Visible and social with peers in the milieu.  Tending to ADLs appropriately; appetite and self-care adequate.  Utilizing PRN has no nightly \"because I am sleeping in a strange place.\"  Speech hyperverbal at times, but responds well to redirection.  Endorses improved anxiety and depression and presents with congruent affect.  Forward thinking with plan to return to work and continue outpatient psychiatric follow-up/therapy.  Identifies his mom and sister are strong supports and protective factors against suicide.    Objective   Mental Status Evaluation:  Appearance:  casually dressed, overweight, and long blonde hair   Behavior:  Cooperative, social, and eye contact   Speech:  Hyperverbal at times   Mood:  euthymic   Affect:  mood-congruent and redirectable   Thought Process:  Linear, goal directed   Associations: circumstantial associations   Thought Content:  No overt delusions or paranoia verbalized   Perceptual Disturbances: Denied AVH, did not appear internally preoccupied   Risk Potential: Suicidal Ideations none  Homicidal Ideations none  Potential for Aggression No   Sensorium:  person, place, time/date, and situation   Memory:  recent and remote memory grossly intact   Consciousness:  alert and awake    Attention: attention span appeared shorter than expected for age   Insight:  limited   Judgment: limited   Gait/Station: normal gait/station and normal balance   Motor Activity: no abnormal movements     Medications: all current active meds have been reviewed and continue current psychiatric medications.      Lab Results: I have reviewed the following results:   Drug Screen:   Lab Results   Component Value Date    AMPMETHUR Negative 09/11/2024    BARBTUR Negative 09/11/2024    BDZUR Negative 09/11/2024    THCUR Positive (A) 09/11/2024    COCAINEUR Negative 09/11/2024    METHADONEUR Negative 09/11/2024    OPIATEUR Negative 09/11/2024    PCPUR Negative 09/11/2024 "       Administrative Statements   I have spent a total time of 35 minutes in caring for this patient on the day of the visit/encounter including medication education, treatment plan, supportive therapy, safety/discharge planning.

## 2024-09-18 NOTE — PROGRESS NOTES
09/18/24    Team Meeting   Meeting Type Daily Rounds   Team Members Present   Team Members Present Physician;Nurse;;Occupational Therapist   Physician Team Member Daniel Brice DO, MD, Deepti BUSCH   Nursing Team Member Margret DEVI   Social Work Team Member Gustavo PUENTE   OT Team Member Pope MEDINA, Intern   Patient/Family Present   Patient Present No   Patient's Family Present No     201,dc home tomorrow with OP psych, refusing all meds, took prn trazadone

## 2024-09-18 NOTE — ASSESSMENT & PLAN NOTE
Patient was previosly scheduled to start Depakote, but had refused it.  Patient started on Risperdal 1 mg at nigh over weekend but continues to refuse. Agreeable to discontinue Risperdal 1mg  Continue Seroquel 50mg PO QHS for intermittent anxiety, mood control, and racing thoughts. Antihistaminic effects of Seroquel will also assist with sleep.   Signed 72-hour notice 9/16/2024 at 1248

## 2024-09-18 NOTE — PLAN OF CARE
Problem: Ineffective Coping  Goal: Identifies ineffective coping skills  Outcome: Progressing     Problem: Risk for Self Injury/Neglect  Goal: Refrain from harming self  Description: Interventions:  - Monitor patient closely, per order  - Develop a trusting relationship  - Supervise medication ingestion, monitor effects and side effects   Outcome: Progressing

## 2024-09-19 VITALS
HEIGHT: 72 IN | OXYGEN SATURATION: 97 % | RESPIRATION RATE: 16 BRPM | TEMPERATURE: 97.8 F | WEIGHT: 235.8 LBS | SYSTOLIC BLOOD PRESSURE: 128 MMHG | HEART RATE: 74 BPM | DIASTOLIC BLOOD PRESSURE: 77 MMHG | BODY MASS INDEX: 31.94 KG/M2

## 2024-09-19 PROBLEM — G47.9 DIFFICULTY SLEEPING: Status: ACTIVE | Noted: 2024-09-19

## 2024-09-19 PROBLEM — T14.91XA SUICIDAL BEHAVIOR WITH ATTEMPTED SELF-INJURY (HCC): Status: RESOLVED | Noted: 2024-09-12 | Resolved: 2024-09-19

## 2024-09-19 PROBLEM — F31.63 BIPOLAR DISORDER, CURRENT EPISODE MIXED, SEVERE, WITHOUT PSYCHOTIC FEATURES (HCC): Status: RESOLVED | Noted: 2024-09-12 | Resolved: 2024-09-19

## 2024-09-19 PROCEDURE — 99238 HOSP IP/OBS DSCHRG MGMT 30/<: CPT | Performed by: NURSE PRACTITIONER

## 2024-09-19 RX ORDER — LANOLIN ALCOHOL/MO/W.PET/CERES
3 CREAM (GRAM) TOPICAL
Qty: 30 TABLET | Refills: 0 | Status: SHIPPED | OUTPATIENT
Start: 2024-09-19 | End: 2024-10-19

## 2024-09-19 RX ORDER — QUETIAPINE FUMARATE 50 MG/1
50 TABLET, FILM COATED ORAL
Qty: 30 TABLET | Refills: 1 | Status: SHIPPED | OUTPATIENT
Start: 2024-09-19 | End: 2024-11-18

## 2024-09-19 RX ADMIN — Medication 1000 UNITS: at 08:17

## 2024-09-19 RX ADMIN — Medication 250 MG: at 08:17
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
## 2024-09-19 NOTE — NURSING NOTE
Patient appropriate in mood and affect. Patient denied SI/HI. All discharge paperwork reviewed with patient. Patient escorted to the main lobby with all belongings.

## 2024-09-19 NOTE — PROGRESS NOTES
Progress Note - Bob Christine 24 y.o. male MRN: [id number]    Unit/Bed#: [facility] 218-02 Encounter: [id number]        Subjective:   Patient seen and examined at bedside after reviewing the chart and discussing the case with the caring staff.      Patient examined at bedside.  Patient reports no acute symptoms.     Patient is being discharged today, 9/19/24.    Physical Exam   Vitals: Blood pressure 149/87, pulse 78, temperature 97.5 °F (36.4 °C), temperature source Temporal, resp. rate 16, height 6' (1.829 m), weight 107 kg (235 lb 12.8 oz), SpO2 97%.,Body mass index is 31.98 kg/m².  Constitutional: Patient in no acute distress.  HEENT: PERR, EOMI, MMM.  Cardiovascular: Normal rate and regular rhythm.    Pulmonary/Chest: Effort normal and breath sounds normal.   Abdomen: Soft, + BS, NT.    Assessment/Plan:  Bob Christine is a(n) 24 y.o. male with MDD.     Medical Clearance: Patient is medically cleared for discharge. All prescriptions have been sent to pharmacy.     Hx Kienbock's disease/R wrist pain.  Recent hx of R arm/wrist surgery.  Continue wearing R wrist brace.  Ibuprofen/tylenol for pain.  PT/OT consulted.   Acute sinusitis/cough.  Start Augmentin 875-125 mg twice daily x 5 days (9/13) along with Florestor twice daily.  Mucinex at bedtime.   Arthralgias/HA.  Ibuprofen as needed.   Insomnia.  Patient on melatonin 3 mg daily at bedtime.  Vit D insufficiency.  Levels normal.  Restart daily supplement per patient request.     The patient was discussed with Dr. Sood and he is in agreement with the above note.

## 2024-09-19 NOTE — PROGRESS NOTES
09/19/24    Team Meeting   Meeting Type Daily Rounds   Team Members Present   Team Members Present Physician;Nurse;;Occupational Therapist   Physician Team Member Daniel Brice DO, MD, Deepti BUSCH   Nursing Team Member Charity DEVI   Social Work Team Member Gustavo PUENTE   OT Team Member Pope MEDINA   Patient/Family Present   Patient Present No   Patient's Family Present No     201, dc today home with OP psych, cooperative, childlike

## 2024-09-19 NOTE — NURSING NOTE
Gone over with pt belongings at 0800 09 19 2024   Clothing belt sneakers    1 cell phone 1 wrist band on pts hand     Safe bag will be noted once brought to unit via security

## 2024-09-19 NOTE — NUTRITION
09/19/24 1347   Biochemical Data,Medical Tests, and Procedures   Biochemical Data/Medical Tests/Procedures Lab values reviewed;Meds reviewed   Labs (Comment) 9/12 Vit [id number]   Meds (Comment) cogentin, Vit D, haldol, ativan, melatonin, inderal, florastor, seroquel, desyrel   Nutrition-Focused Physical Exam   Nutrition-Focused Physical Exam Findings RN skin assessment reviewed;No skin issues documented   Medical-Related Concerns ADHD, anxiety   Adequacy of Intake   Nutrition Modality PO   Feeding Route   PO Independent   Current PO Intake   Current Diet Order Regular diet thin liquids   Current Meal Intake %   Estimated calorie intake compared to estimated need Nutrient needs met.   PES Statement   Problem No nutrition diagnosis   Recommendations/Interventions   Malnutrition/BMI Present No  (does not meet criteria)   Summary Length of stay. Regular diet thin liquids. Meal completions 100%. Unable to obtain nutrition history at this time. Chart utilized for information. 9/15/#; 7/12/#; 4/1/#. Weight stable. Skin intact. Patient for discharge today.   Interventions/Recommendations Continue current diet order   Education Assessment   Education Education not indicated at this time   Nutrition Complexity Risk   Nutrition complexity level Low risk   Follow up date 10/03/24

## 2024-09-19 NOTE — BH TRANSITION RECORD
Contact Information: If you have any questions, concerns, pended studies, tests and/or procedures, or emergencies regarding your inpatient behavioral health visit. Please contact [facility] # [phone] and ask to speak to a , nurse or physician. A contact is available 24 hours/ 7 days a week at this number.     Summary of Procedures Performed During your Stay:  Below is a list of major procedures performed during your hospital stay and a summary of results:  - No major procedures performed.    Pending Studies (From admission, onward)      None          Please follow up on the above pending studies with your PCP and/or referring provider.